# Patient Record
Sex: MALE | Race: WHITE | Employment: OTHER | URBAN - METROPOLITAN AREA
[De-identification: names, ages, dates, MRNs, and addresses within clinical notes are randomized per-mention and may not be internally consistent; named-entity substitution may affect disease eponyms.]

---

## 2017-04-01 NOTE — TELEPHONE ENCOUNTER
Patient calling to get an RX [facility] medications [facility]   I tried selecting pharmacy and searching it and was not able to but I found it on [address]  Erasmo Kendall phone# [phone]

## 2017-04-05 NOTE — TELEPHONE ENCOUNTER
Refill protocol failed because the patient did not meet the protocol criteria.  Please advise in regards to refill request

## 2017-04-05 NOTE — TELEPHONE ENCOUNTER
Cholesterol Medications  Protocol Criteria:  · Appointment scheduled in the past 12 months or in the next 3 months  · ALT & LDL on file in the past 12 months  · ALT result < 80  · LDL result <130   Recent Visits       Provider Department Primary Dx    7 mo

## 2017-04-06 RX ORDER — ATORVASTATIN CALCIUM 10 MG/1
10 TABLET, FILM COATED ORAL NIGHTLY
Qty: 90 TABLET | Refills: 0 | Status: SHIPPED | OUTPATIENT
Start: 2017-04-06 | End: 2017-07-13

## 2017-07-07 ENCOUNTER — TELEPHONE (OUTPATIENT)
Dept: INTERNAL MEDICINE CLINIC | Facility: CLINIC | Age: 62
End: 2017-07-07

## 2017-07-07 NOTE — TELEPHONE ENCOUNTER
Pt requesting a refill     Current Outpatient Prescriptions:  Atorvastatin Calcium 10 MG Oral Tab Take 1 tablet (10 mg total) by mouth nightly.  Disp: 90 tablet Rfl: 0   MetFORMIN HCl 500 MG Oral Tab Take 2 tablets (1,000 mg total) by mouth 2 (two) times da

## 2017-07-11 ENCOUNTER — TELEPHONE (OUTPATIENT)
Dept: INTERNAL MEDICINE CLINIC | Facility: CLINIC | Age: 62
End: 2017-07-11

## 2017-07-11 NOTE — TELEPHONE ENCOUNTER
Pt dropped forms to be completed by Dr Viktoria Juarez for a job and needs them back as soon as possible. Forms were put in Dr Nicholas Dimas box at [employer].

## 2017-07-12 NOTE — TELEPHONE ENCOUNTER
Both medications fail protocol - please advise  Metoprolol and atorvastatin  ________________________________________________    Hypertensive Medications  Protocol Criteria: CRITERIA FAILED  · Appointment scheduled in the past 6 months or in the next 3 mon Tomorrow Jami Wiley MD [facility], [facility] [place], [address] [facility] Monique Ramos saw Tawnya Lance in Aug. Needs paperwork filled out for job. Paperwork is in RX drawer.  Pt is a self pay          Lab Results  Component Value Date   LDL Test Not Performed 09/08/2016       L

## 2017-07-13 ENCOUNTER — OFFICE VISIT (OUTPATIENT)
Dept: INTERNAL MEDICINE CLINIC | Facility: CLINIC | Age: 62
End: 2017-07-13

## 2017-07-13 ENCOUNTER — APPOINTMENT (OUTPATIENT)
Dept: LAB | Age: 62
End: 2017-07-13
Attending: INTERNAL MEDICINE

## 2017-07-13 VITALS
TEMPERATURE: 99 F | SYSTOLIC BLOOD PRESSURE: 121 MMHG | HEIGHT: 70 IN | BODY MASS INDEX: 30.92 KG/M2 | WEIGHT: 216 LBS | HEART RATE: 73 BPM | DIASTOLIC BLOOD PRESSURE: 72 MMHG

## 2017-07-13 DIAGNOSIS — E11.9 TYPE 2 DIABETES MELLITUS WITHOUT COMPLICATION, WITHOUT LONG-TERM CURRENT USE OF INSULIN (HCC): Primary | ICD-10-CM

## 2017-07-13 DIAGNOSIS — E78.00 PURE HYPERCHOLESTEROLEMIA: ICD-10-CM

## 2017-07-13 DIAGNOSIS — E11.9 TYPE 2 DIABETES MELLITUS WITHOUT COMPLICATION, WITHOUT LONG-TERM CURRENT USE OF INSULIN (HCC): ICD-10-CM

## 2017-07-13 LAB
ALBUMIN SERPL BCP-MCNC: 4.1 G/DL (ref 3.5–4.8)
ALBUMIN/GLOB SERPL: 1.3 {RATIO} (ref 1–2)
ALP SERPL-CCNC: 36 U/L (ref 32–100)
ALT SERPL-CCNC: 33 U/L (ref 17–63)
ANION GAP SERPL CALC-SCNC: 11 MMOL/L (ref 0–18)
AST SERPL-CCNC: 23 U/L (ref 15–41)
BILIRUB SERPL-MCNC: 0.8 MG/DL (ref 0.3–1.2)
BILIRUB UR QL: NEGATIVE
BUN SERPL-MCNC: 10 MG/DL (ref 8–20)
BUN/CREAT SERPL: 13.3 (ref 10–20)
CALCIUM SERPL-MCNC: 9.5 MG/DL (ref 8.5–10.5)
CHLORIDE SERPL-SCNC: 103 MMOL/L (ref 95–110)
CLARITY UR: CLEAR
CO2 SERPL-SCNC: 24 MMOL/L (ref 22–32)
COLOR UR: YELLOW
CREAT SERPL-MCNC: 0.75 MG/DL (ref 0.5–1.5)
CREAT UR-MCNC: 93.3 MG/DL
GLOBULIN PLAS-MCNC: 3.2 G/DL (ref 2.5–3.7)
GLUCOSE SERPL-MCNC: 116 MG/DL (ref 70–99)
GLUCOSE UR-MCNC: >=500 MG/DL
HBA1C MFR BLD: 5.9 % (ref 4–6)
HGB UR QL STRIP.AUTO: NEGATIVE
KETONES UR-MCNC: NEGATIVE MG/DL
LEUKOCYTE ESTERASE UR QL STRIP.AUTO: NEGATIVE
MICROALBUMIN UR-MCNC: 0.6 MG/DL (ref 0–1.8)
MICROALBUMIN/CREAT UR: 6.4 MG/G{CREAT} (ref 0–20)
NITRITE UR QL STRIP.AUTO: NEGATIVE
OSMOLALITY UR CALC.SUM OF ELEC: 286 MOSM/KG (ref 275–295)
PH UR: 6 [PH] (ref 5–8)
POTASSIUM SERPL-SCNC: 4.1 MMOL/L (ref 3.3–5.1)
PROT SERPL-MCNC: 7.3 G/DL (ref 5.9–8.4)
PROT UR-MCNC: NEGATIVE MG/DL
RBC #/AREA URNS AUTO: <1 /HPF
SODIUM SERPL-SCNC: 138 MMOL/L (ref 136–144)
SP GR UR STRIP: 1.02 (ref 1–1.03)
UROBILINOGEN UR STRIP-ACNC: <2
VIT C UR-MCNC: NEGATIVE MG/DL
WBC #/AREA URNS AUTO: <1 /HPF

## 2017-07-13 PROCEDURE — 82043 UR ALBUMIN QUANTITATIVE: CPT

## 2017-07-13 PROCEDURE — 83036 HEMOGLOBIN GLYCOSYLATED A1C: CPT

## 2017-07-13 PROCEDURE — 99214 OFFICE O/P EST MOD 30 MIN: CPT | Performed by: INTERNAL MEDICINE

## 2017-07-13 PROCEDURE — 36415 COLL VENOUS BLD VENIPUNCTURE: CPT

## 2017-07-13 PROCEDURE — 80053 COMPREHEN METABOLIC PANEL: CPT

## 2017-07-13 PROCEDURE — 99212 OFFICE O/P EST SF 10 MIN: CPT | Performed by: INTERNAL MEDICINE

## 2017-07-13 PROCEDURE — 81001 URINALYSIS AUTO W/SCOPE: CPT

## 2017-07-13 PROCEDURE — 82570 ASSAY OF URINE CREATININE: CPT

## 2017-07-13 RX ORDER — ATORVASTATIN CALCIUM 10 MG/1
10 TABLET, FILM COATED ORAL NIGHTLY
Qty: 90 TABLET | Refills: 0 | Status: SHIPPED | OUTPATIENT
Start: 2017-07-13 | End: 2017-07-18

## 2017-07-13 NOTE — PROGRESS NOTES
Patient ID: Negro Mom is a 60year old male. Patient presents with:  Complete Form  Medication Request: Atorvastatin and Metformin       HISTORY OF PRESENT ILLNESS:   HPI  Patient presents for above.   Here to have forms filled out so that he can wo Vitamins-Minerals (MULTI-VITAMIN/MINERALS) Oral Tab, Take 1 tablet by mouth daily. , Disp: , Rfl:   •  Glucose Blood (FREESTYLE LITE TEST) In Vitro Strip, Test by Intradermal route 2 times every day, Disp: 100 each, Rfl: 11  •  [employer] LANCETS ULTRATHIN 30G diabetes mellitus without complication, without long-term current use of insulin (HCC)  · MetFORMIN HCl 500 MG Oral Tab; Take 2 tablets (1,000 mg total) by mouth 2 (two) times daily with meals. Dispense: 360 tablet;  Refill: 0  · COMP METABOLIC PANEL (14);

## 2017-07-13 NOTE — PATIENT INSTRUCTIONS
Diabetes and Heart Disease     Take your medicines as directed each day, even if you feel fine. If you have diabetes, you are two to four times more likely to have heart disease than someone without diabetes.  This higher risk is due to diabetes, but it Certain lifestyle factors can increase levels of your blood sugar, blood pressure, and lipids. Such increases raise your risk of heart disease:  · Smoking damages the lining of your arteries. This allows plaque to build up in the artery walls.  Smoking also · Eating right can reduce your risk factors and help you lose weight. Try to limit the amount of processed or refined carbohydrates you eat at one time. Cut back on your total calorie intake. Eat foods low in saturated fat and cholesterol.  Eat fiber, inclu

## 2017-07-18 ENCOUNTER — TELEPHONE (OUTPATIENT)
Dept: INTERNAL MEDICINE CLINIC | Facility: CLINIC | Age: 62
End: 2017-07-18

## 2017-07-18 DIAGNOSIS — E78.00 PURE HYPERCHOLESTEROLEMIA: ICD-10-CM

## 2017-07-18 RX ORDER — ATORVASTATIN CALCIUM 10 MG/1
10 TABLET, FILM COATED ORAL NIGHTLY
Qty: 90 TABLET | Refills: 0 | Status: SHIPPED | OUTPATIENT
Start: 2017-07-18 | End: 2017-10-17

## 2017-07-18 NOTE — TELEPHONE ENCOUNTER
Pt was seen in the office on Friday and the med below was not sent over to the pharmacy. Please re send. Pt is out. atorvastatin 10 MG Oral Tab 90 tablet 0 7/13/2017    Sig :  Take 1 tablet (10 mg total) by mouth nightly.      Route:   Oral     Order #

## 2017-07-18 NOTE — TELEPHONE ENCOUNTER
Pt was seen in the office on Friday and the med below was not sent over to the pharmacy. Please re send. Pt is out. atorvastatin 10 MG Oral Tab 90 tablet 0 7/13/2017    Sig :  Take 1 tablet (10 mg total) by mouth nightly.      Route:   Oral     Order #:

## 2017-07-19 NOTE — TELEPHONE ENCOUNTER
Called pharmacy, they still do not have prescription for atorvastatin. Phoned in as ordered by Dr. Debra Cardona.

## 2017-10-16 ENCOUNTER — TELEPHONE (OUTPATIENT)
Dept: INTERNAL MEDICINE CLINIC | Facility: CLINIC | Age: 62
End: 2017-10-16

## 2017-10-16 DIAGNOSIS — E11.9 TYPE 2 DIABETES MELLITUS WITHOUT COMPLICATION, WITHOUT LONG-TERM CURRENT USE OF INSULIN (HCC): ICD-10-CM

## 2017-10-16 DIAGNOSIS — E78.00 PURE HYPERCHOLESTEROLEMIA: ICD-10-CM

## 2017-10-16 NOTE — TELEPHONE ENCOUNTER
Pt would like a refill on atorvastatin and metformin medication. Pharmacy: [facility]      Current Outpatient Prescriptions:  atorvastatin 10 MG Oral Tab Take 1 tablet (10 mg total) by mouth nightly.  Disp: 90 tablet Rfl: 0   MetFORMIN HCl 500 M

## 2017-10-17 RX ORDER — ATORVASTATIN CALCIUM 10 MG/1
10 TABLET, FILM COATED ORAL NIGHTLY
Qty: 90 TABLET | Refills: 0 | Status: SHIPPED | OUTPATIENT
Start: 2017-10-17 | End: 2018-01-19

## 2017-10-17 NOTE — TELEPHONE ENCOUNTER
rxs refilled as per protocol.   Diabetes Medications  Protocol Criteria:  · Appointment scheduled in the past 6 months or the next 3 months  · A1C < 7.5 in the past 6 months  · Creatinine in the past 12 months  · Creatinine result < 1.5   Recent Outpatien right flank pain    Melanie Jeffrey MD    Office Visit    2 years ago Type 2 diabetes mellitus without complication [facility])    Melanie Jeffrey MD    Office Visit

## 2018-01-19 DIAGNOSIS — E78.00 PURE HYPERCHOLESTEROLEMIA: ICD-10-CM

## 2018-01-19 NOTE — TELEPHONE ENCOUNTER
Pt calling to request refill for atorvastatin 10 MG Oral Tab. Please advise pt has two left      Current Outpatient Prescriptions:  atorvastatin 10 MG Oral Tab Take 1 tablet (10 mg total) by mouth nightly.  Disp: 90 tablet Rfl: 0

## 2018-01-19 NOTE — TELEPHONE ENCOUNTER
Pending Prescriptions Disp Refills    atorvastatin 10 MG Oral Tab 90 tablet 0     Sig: Take 1 tablet (10 mg total) by mouth nightly.            Last Office Visit with PCP: Visit date not found  Last Blood Pressures:  BP Readings from Last 2 Encounters:  0

## 2018-01-22 DIAGNOSIS — E11.9 TYPE 2 DIABETES MELLITUS WITHOUT COMPLICATION, WITHOUT LONG-TERM CURRENT USE OF INSULIN (HCC): ICD-10-CM

## 2018-01-22 RX ORDER — ATORVASTATIN CALCIUM 10 MG/1
10 TABLET, FILM COATED ORAL NIGHTLY
Qty: 90 TABLET | Refills: 0 | Status: SHIPPED | OUTPATIENT
Start: 2018-01-22 | End: 2018-04-18

## 2018-04-18 DIAGNOSIS — E78.00 PURE HYPERCHOLESTEROLEMIA: ICD-10-CM

## 2018-04-19 RX ORDER — ATORVASTATIN CALCIUM 10 MG/1
TABLET, FILM COATED ORAL
Qty: 90 TABLET | Refills: 0 | Status: SHIPPED | OUTPATIENT
Start: 2018-04-19 | End: 2019-04-25

## 2018-04-21 DIAGNOSIS — E78.00 PURE HYPERCHOLESTEROLEMIA: ICD-10-CM

## 2018-04-21 DIAGNOSIS — E11.9 TYPE 2 DIABETES MELLITUS WITHOUT COMPLICATION, WITHOUT LONG-TERM CURRENT USE OF INSULIN (HCC): ICD-10-CM

## 2018-04-23 RX ORDER — ATORVASTATIN CALCIUM 10 MG/1
TABLET, FILM COATED ORAL
Qty: 90 TABLET | Refills: 0 | Status: SHIPPED | OUTPATIENT
Start: 2018-04-23 | End: 2018-07-25

## 2018-07-25 DIAGNOSIS — E78.00 PURE HYPERCHOLESTEROLEMIA: ICD-10-CM

## 2018-07-25 DIAGNOSIS — E11.9 TYPE 2 DIABETES MELLITUS WITHOUT COMPLICATION, WITHOUT LONG-TERM CURRENT USE OF INSULIN (HCC): ICD-10-CM

## 2018-07-25 RX ORDER — ATORVASTATIN CALCIUM 10 MG/1
TABLET, FILM COATED ORAL
Qty: 90 TABLET | Refills: 0 | Status: SHIPPED | OUTPATIENT
Start: 2018-07-25 | End: 2018-09-10

## 2018-09-10 ENCOUNTER — OFFICE VISIT (OUTPATIENT)
Dept: INTERNAL MEDICINE CLINIC | Facility: CLINIC | Age: 63
End: 2018-09-10
Payer: COMMERCIAL

## 2018-09-10 VITALS
HEIGHT: 70 IN | BODY MASS INDEX: 31.5 KG/M2 | WEIGHT: 220 LBS | SYSTOLIC BLOOD PRESSURE: 134 MMHG | HEART RATE: 85 BPM | DIASTOLIC BLOOD PRESSURE: 75 MMHG

## 2018-09-10 DIAGNOSIS — E11.9 TYPE 2 DIABETES MELLITUS WITHOUT COMPLICATION, WITHOUT LONG-TERM CURRENT USE OF INSULIN (HCC): ICD-10-CM

## 2018-09-10 DIAGNOSIS — Z00.00 PHYSICAL EXAM, ANNUAL: Primary | ICD-10-CM

## 2018-09-10 PROCEDURE — 99214 OFFICE O/P EST MOD 30 MIN: CPT | Performed by: INTERNAL MEDICINE

## 2018-09-10 PROCEDURE — 99212 OFFICE O/P EST SF 10 MIN: CPT | Performed by: INTERNAL MEDICINE

## 2018-09-10 NOTE — PROGRESS NOTES
Camelia Wlil is a 58year old male who presents for a complete physical exam.   HPI:   Pt complains of diabetes    There is no immunization history on file for this patient.   Wt Readings from Last 6 Encounters:  09/10/18 : 220 lb (99.8 kg)  07/13/17 : 2 daily. Disp:  Rfl:    Glucosamine-Chondroitin 750-600 MG Oral Tab Take 1 tablet by mouth daily.  Disp:  Rfl:       Past Medical History:   Diagnosis Date   • Diabetes ([facility] [address]    • High cholesterol    • Hyperlipidemia       Past Surgical History:  No date: LAST clear  EYES:PERRLA, EOMI, normal optic disk,conjunctiva are clear  NECK: supple,no adenopathy,no bruits  CHEST: no chest tenderness  BREAST: no dominant or suspicious mass  LUNGS: clear to auscultation  CARDIO: RRR without murmur  GI: good BS's,no masses,

## 2018-10-11 DIAGNOSIS — E11.9 TYPE 2 DIABETES MELLITUS WITHOUT COMPLICATION, WITHOUT LONG-TERM CURRENT USE OF INSULIN (HCC): ICD-10-CM

## 2019-01-13 DIAGNOSIS — E11.9 TYPE 2 DIABETES MELLITUS WITHOUT COMPLICATION, WITHOUT LONG-TERM CURRENT USE OF INSULIN (HCC): ICD-10-CM

## 2019-01-13 DIAGNOSIS — E78.00 PURE HYPERCHOLESTEROLEMIA: ICD-10-CM

## 2019-01-13 NOTE — TELEPHONE ENCOUNTER
Please review; protocol failed.     Cholesterol Medications  Protocol Criteria:  · Appointment scheduled in the past 12 months or in the next 3 months  · ALT & LDL on file in the past 12 months  · ALT result < 80  · LDL result <130   Recent Outpatient Visit Aniya Richardson MD    Office Visit    2 years ago Chronic right flank pain    Aniya Richardson MD    Office Visit          Lab Results   Component Value Date    A1C 5.9 07

## 2019-01-14 RX ORDER — ATORVASTATIN CALCIUM 10 MG/1
TABLET, FILM COATED ORAL
Qty: 90 TABLET | Refills: 0 | Status: SHIPPED | OUTPATIENT
Start: 2019-01-14 | End: 2019-04-23

## 2019-01-19 DIAGNOSIS — E78.00 PURE HYPERCHOLESTEROLEMIA: ICD-10-CM

## 2019-01-19 DIAGNOSIS — E11.9 TYPE 2 DIABETES MELLITUS WITHOUT COMPLICATION, WITHOUT LONG-TERM CURRENT USE OF INSULIN (HCC): ICD-10-CM

## 2019-01-19 RX ORDER — ATORVASTATIN CALCIUM 10 MG/1
TABLET, FILM COATED ORAL
Qty: 90 TABLET | Refills: 0 | OUTPATIENT
Start: 2019-01-19

## 2019-01-20 NOTE — TELEPHONE ENCOUNTER
Requested Prescriptions     Pending Prescriptions Disp Refills   • METFORMIN  MG Oral Tab [Pharmacy Med Name: MetFORMIN HCl Oral Tablet 500 MG] 360 tablet 0     Sig: TAKE 2 TABLETS BY MOUTH TWO TIMES A DAY WITH MEALS   • ATORVASTATIN 10 MG Oral Ta

## 2019-04-03 NOTE — TELEPHONE ENCOUNTER
Medications refilled at appointment visit. Appropriate labs ordered to fill out forms. Further recommendations are going to be based on these lab tests. Parainfluenza ; cough /bronchospam ; Ear infection        If fever or symptoms persist follow-up with PCP or return to UC or ER     RX:  azithro    tamiflu   pred  inhlaer      OTC  Nasal saline 2-3 puffs 3-4 times per day Ã5 days  Mucinex/guaifenesin 400 mg 3 times a day Ã7 days    Increase non-water fluid  V8 splash   Pedialyte 6-8 ounces every hour Ã1-2 days  Clear soup 3-4 times per day x 3 day ( caution if on  Salt restricted diet)  Coconut water    Probiotics:  Choose one, take 2 time per day for length of Rx  Rosanna santos  Acidophilus      Patient Education     2009 H1N1 Influenza (Swine Flu) (Adult)  The 2009 H1N1 influenza (swine flu) is a respiratory disease caused by the influenza A (H1N1) strain of the influenza viruses. Scientists originally thought the virus came from pigs (swine). But it is now known that this is not the case. This germ is a virus that was discovered in 2009. The 2009 H1N1 flu virus can be passed among people the same way the regular flu spreads--through droplets that form when someone with the virus coughs, sneezes, laughs, or talks. These droplets pass from person to person through the air. You can also become infected if you touch a surface on which the droplets have landed and then transfer the virus to your eyes, nose, or mouth. 2009 H1N1 flu symptoms are about the same as regular flu symptoms. These include fever and chills, headache, body and muscle aches, dry cough, runny nose and weakness. You may also have sore throat, diarrhea, or vomiting. There is no way to know for sure in the emergency department whether you have 2009 H1N1 or another type of influenza. If the 2009 H1N1 flu is in your area, you may be tested for it. You will be notified when the test results come back. In the meantime, follow the instructions you are given, including the measures below.   Home Care  Note: Seek Immediate Medical Care If YouâRe Having A Hard Time Breathing Or Are Breathing Very Fast, Or If YouâRe Starting To Get Confused. (See additional emergency warning signs below under Get Prompt Medical Attention.)  Medications:   Â· If the 2009 H1N1 flu is in your area and your symptoms are severe, the doctor may prescribe medications called antivirals. These must be taken within 2 days of when your symptoms started. Antivirals work by stopping the virus from reproducing in your body. This gives your bodyâs immune system a chance to fight the virus. After taking the medication, your symptoms may be milder and you may recover quicker than without the medication. The medication may also prevent serious complications such as pneumonia. Mild side effects from these drugs occasionally occur (the chance of side effects is 5% to 10%). Serious side effects are rare. The doctor will decide if this medication is needed. Follow your doctorâs instructions for taking these medications. Take ALL medication as prescribed until it is gone. Â· If your symptoms are mild or it has been more than 2 days since your symptoms started, the doctor will likely not prescribe medications. Â· Antibiotics are NOT helpful against influenza. Â· You may use acetaminophen (Tylenol) or ibuprofen (Motrin, Advil) to control fever and muscle aching. (NOTE: If you have chronic liver or kidney disease or ever had a stomach ulcer or GI bleeding, talk with your doctor before using these medicines.) Do not give aspirin to anyone under 25years of age who is ill with a fever. It may cause severe liver damage. Â· A vaccine against 2009 H1N1 flu is available. Talk to your doctor about whether the vaccine is right for you. General Care:   Â· Unless told otherwise by your doctor, drink plenty of non-alcoholic fluids, such as water or juice, to prevent dehydration. A good rule is to drink enough so that you urinate your normal amount. Â· Get plenty of rest.  Preventing The Spread:   Â· Wash your hands often, especially after coughing or sneezing.  Or, clean your hands with an alcohol-based hand gel containing at least 60 percent alcohol. Â· Cough or sneeze into a tissue. Then throw the tissue away and wash your hands. If you donât have a tissue, cough or sneeze into the crook of your elbow. Ask your healthcare provider whether you should wear a medical facemask over your mouth and nose to help prevent spreading the virus. Â· Stay home until your fever has been gone for at least 24 hours and you donât have fever symptoms (such as chills). Be sure the fever isnât being hidden by fever-reducing medications (such as acetaminophen or ibuprofen). Â· Donât share food, utensils, drinking glasses, or a toothbrush with others. Â· Have family members wash their hands often. They should avoid touching their eyes, nose, and mouth as much as possible. Â· Ask your doctor whether others in your household should receive antiviral medication to help them avoid infection. Follow Up  with your doctor or as directed by our staff if you are not improving over the next week. You will be told how to get test results. Note:  2009 H1N1 flu is not caused by eating pork or pork products. Eating pork or pork products that have been properly handled and cooked is safe. To learn more about 2009 H1N1 flu, visit the Centers for Disease Control and Prevention (CDC) website: www.cdc.gov/flu/swineflu/index.htm. Get Prompt Medical Attention  if any of the following occur:  Â· Trouble breathing or shortness of breath  Â· Dizziness or lightheadedness  Â· Chest pain or pressure  Â· Confusion, behavior change, or seizure  Â· Severe or repeated vomiting  Â© 6678-7708 [address]. All rights reserved. This information is not intended as a substitute for professional medical care. Always follow your healthcare professional's instructions.

## 2019-04-23 DIAGNOSIS — E78.00 PURE HYPERCHOLESTEROLEMIA: ICD-10-CM

## 2019-04-25 RX ORDER — ATORVASTATIN CALCIUM 10 MG/1
TABLET, FILM COATED ORAL
Qty: 90 TABLET | Refills: 2 | Status: SHIPPED | OUTPATIENT
Start: 2019-04-25 | End: 2019-12-23

## 2019-04-25 NOTE — TELEPHONE ENCOUNTER
Please review; protocol failed. Patient left a phone message and sent a MyChart message about lab work.     Cholesterol Medications  Protocol Criteria:  · Appointment scheduled in the past 12 months or in the next 3 months  · ALT & LDL on file in the past 1

## 2019-05-03 ENCOUNTER — LAB ENCOUNTER (OUTPATIENT)
Dept: LAB | Age: 64
End: 2019-05-03
Attending: INTERNAL MEDICINE
Payer: MEDICARE

## 2019-05-03 ENCOUNTER — OFFICE VISIT (OUTPATIENT)
Dept: INTERNAL MEDICINE CLINIC | Facility: CLINIC | Age: 64
End: 2019-05-03
Payer: MEDICARE

## 2019-05-03 VITALS
RESPIRATION RATE: 16 BRPM | HEIGHT: 70 IN | HEART RATE: 76 BPM | DIASTOLIC BLOOD PRESSURE: 77 MMHG | SYSTOLIC BLOOD PRESSURE: 128 MMHG | WEIGHT: 220 LBS | BODY MASS INDEX: 31.5 KG/M2

## 2019-05-03 DIAGNOSIS — Z13.6 SCREENING FOR CARDIOVASCULAR CONDITION: ICD-10-CM

## 2019-05-03 DIAGNOSIS — Z13.1 SCREENING FOR DIABETES MELLITUS (DM): ICD-10-CM

## 2019-05-03 DIAGNOSIS — Z12.5 SCREENING FOR PROSTATE CANCER: ICD-10-CM

## 2019-05-03 DIAGNOSIS — Z00.00 PHYSICAL EXAM, ANNUAL: Primary | ICD-10-CM

## 2019-05-03 DIAGNOSIS — Z00.00 ENCOUNTER FOR ANNUAL HEALTH EXAMINATION: ICD-10-CM

## 2019-05-03 DIAGNOSIS — E11.9 TYPE 2 DIABETES MELLITUS WITHOUT COMPLICATION, WITHOUT LONG-TERM CURRENT USE OF INSULIN (HCC): ICD-10-CM

## 2019-05-03 DIAGNOSIS — Z13.6 ENCOUNTER FOR ABDOMINAL AORTIC ANEURYSM SCREENING: ICD-10-CM

## 2019-05-03 DIAGNOSIS — Z11.59 NEED FOR HEPATITIS C SCREENING TEST: ICD-10-CM

## 2019-05-03 PROCEDURE — 82570 ASSAY OF URINE CREATININE: CPT

## 2019-05-03 PROCEDURE — 80061 LIPID PANEL: CPT

## 2019-05-03 PROCEDURE — 84443 ASSAY THYROID STIM HORMONE: CPT

## 2019-05-03 PROCEDURE — 36415 COLL VENOUS BLD VENIPUNCTURE: CPT

## 2019-05-03 PROCEDURE — 80053 COMPREHEN METABOLIC PANEL: CPT

## 2019-05-03 PROCEDURE — G0402 INITIAL PREVENTIVE EXAM: HCPCS | Performed by: INTERNAL MEDICINE

## 2019-05-03 PROCEDURE — 82043 UR ALBUMIN QUANTITATIVE: CPT

## 2019-05-03 PROCEDURE — 85025 COMPLETE CBC W/AUTO DIFF WBC: CPT

## 2019-05-03 PROCEDURE — 83036 HEMOGLOBIN GLYCOSYLATED A1C: CPT

## 2019-05-03 PROCEDURE — 86803 HEPATITIS C AB TEST: CPT

## 2019-05-03 PROCEDURE — 83721 ASSAY OF BLOOD LIPOPROTEIN: CPT

## 2019-05-03 PROCEDURE — G0102 PROSTATE CA SCREENING; DRE: HCPCS | Performed by: INTERNAL MEDICINE

## 2019-05-03 NOTE — PROGRESS NOTES
REASON FOR VISIT:    Brittany Will is a 58year old male who presents for a Medicare Initial Preventative Physical exam.    Patient Care Team: Patient Care Team:  Yanna Giles MD as PCP - General (Internal Medicine)    Patient Active Problem List Rng & Units 7/13/2017 9/8/2016 3/16/2016 8/14/2015 2/28/2014 9/26/2013 7/6/2012   BUN 8 - 20 mg/dL 10 15 17 15 15 11 12   Creatinine 0.50 - 1.50 mg/dL 0.75 0.84 1.00 1.03 1.12 0.85 0.81   Some recent data might be hidden     AST and ALT Latest Ref Rng & Un No  Problems with daily activities? : No  Memory Problems?: No      Fall/Risk Assessment     Have you fallen in the last 12 months?: 0-No  Fall/Risk Scorin        Depression Screening (PHQ-2/PHQ-9): Over the LAST 2 WEEKS   Little interest or pleasure i (H)     Glycohemoglobin (HgA1c) (%)   Date Value   07/13/2017 5.9       Creat/alb ratio  Annually CREATININE (mg/dL)   Date Value   11/22/2011 1.02     Creatinine (mg/dL)   Date Value   07/13/2017 0.75     CREATININE (P) (mg/dL)   Date Value   09/08/2016 0 stream  MUSCULOSKELETAL: denies back pain  NEURO: denies headaches  PSYCHE: denies depression or anxiety  HEMATOLOGIC: denies hx of anemia  ENDOCRINE: denies thyroid history  ALL/ASTHMA: denies hx of allergy or asthma    EXAM:   /77   Pulse 76   Resp year old  who presents for a recheck of  diabetes.  Diabetic control is unknown  Recommendations are: continue present meds, check HgbA1C, check fasting lipids and CMP, lose wgt with carbohydrate controlled diet and exercise, refer to Ophthalmology, check f

## 2019-05-03 NOTE — PATIENT INSTRUCTIONS
Dewayne Will's SCREENING SCHEDULE   Tests on this list are recommended by your physician but may not be covered, or covered at this frequency, by your insurer. Please check with your insurance carrier before scheduling to verify coverage.     AVA to patients who meet one of the following criteria:   • Men who are 73-68 years old and have smoked more than 100 cigarettes in their lifetime   • Anyone with a family history    Colorectal Cancer Screening Covered up to Age 76     Colonoscopy Screen   Cov as a HepB virus carrier   Homosexual men   Illicit injectable drug abusers     Tetanus Toxoid- Only covered with a cut with metal- TD and TDaP Not covered by Medicare Part B) No orders found for this or any previous visit.  This may be covered with your pre

## 2019-05-15 ENCOUNTER — HOSPITAL ENCOUNTER (OUTPATIENT)
Dept: ULTRASOUND IMAGING | Age: 64
Discharge: HOME | End: 2019-05-15
Attending: INTERNAL MEDICINE
Payer: MEDICARE

## 2019-05-15 DIAGNOSIS — Z13.6 ENCOUNTER FOR ABDOMINAL AORTIC ANEURYSM SCREENING: ICD-10-CM

## 2019-05-15 PROCEDURE — 76706 US ABDL AORTA SCREEN AAA: CPT | Performed by: INTERNAL MEDICINE

## 2019-07-01 DIAGNOSIS — E11.9 TYPE 2 DIABETES MELLITUS WITHOUT COMPLICATION, WITHOUT LONG-TERM CURRENT USE OF INSULIN (HCC): ICD-10-CM

## 2019-07-01 NOTE — TELEPHONE ENCOUNTER
Refill passed per [facility] protocol.   Diabetes Medications  Protocol Criteria:  · Appointment scheduled in the past 6 months or the next 3 months  · A1C < 7.5 in the past 6 months  · Creatinine in the past 12 months  · Creatinine result < 1.5   Rece

## 2019-07-05 ENCOUNTER — HOSPITAL ENCOUNTER (OUTPATIENT)
Dept: CT IMAGING | Age: 64
Discharge: HOME | End: 2019-07-05
Attending: INTERNAL MEDICINE

## 2019-07-05 DIAGNOSIS — Z13.6 SCREENING FOR CARDIOVASCULAR CONDITION: ICD-10-CM

## 2019-07-05 NOTE — PROGRESS NOTES
Pt seen at [facility]:  PRELIMINARY ASACI=0788    UJ=460/70    Declined blood work today; reviewed previous Cholesterol lab values with patient. All results and risk factors discussed with patient; all questions and concerns addressed.

## 2019-08-02 ENCOUNTER — HOSPITAL ENCOUNTER (OUTPATIENT)
Dept: ULTRASOUND IMAGING | Age: 64
Discharge: HOME | End: 2019-08-02
Attending: INTERNAL MEDICINE

## 2019-08-02 DIAGNOSIS — Z13.9 ENCOUNTER FOR SCREENING: ICD-10-CM

## 2019-08-02 NOTE — PROGRESS NOTES
Pt seen at [facility] Stroke Screening tests:  PRELIMINARY results as follows:    Carotid US=Right ICA; normal                       Left ICA; normal    Abdominal Aorta US=n/a      TG=565/76    Cholestec labs as follows:  OQ=898  HDL=24  L

## 2019-08-19 ENCOUNTER — OFFICE VISIT (OUTPATIENT)
Dept: CARDIOLOGY CLINIC | Facility: CLINIC | Age: 64
End: 2019-08-19
Payer: MEDICARE

## 2019-08-19 VITALS
DIASTOLIC BLOOD PRESSURE: 77 MMHG | HEART RATE: 97 BPM | BODY MASS INDEX: 32 KG/M2 | WEIGHT: 221 LBS | RESPIRATION RATE: 22 BRPM | OXYGEN SATURATION: 98 % | SYSTOLIC BLOOD PRESSURE: 139 MMHG

## 2019-08-19 DIAGNOSIS — E78.00 PURE HYPERCHOLESTEROLEMIA: ICD-10-CM

## 2019-08-19 DIAGNOSIS — R06.00 DYSPNEA ON EXERTION: ICD-10-CM

## 2019-08-19 DIAGNOSIS — E11.9 TYPE 2 DIABETES MELLITUS WITHOUT COMPLICATION, WITHOUT LONG-TERM CURRENT USE OF INSULIN (HCC): ICD-10-CM

## 2019-08-19 DIAGNOSIS — R93.1 ABNORMAL HEART SCORE CT: Primary | ICD-10-CM

## 2019-08-19 PROCEDURE — 93005 ELECTROCARDIOGRAM TRACING: CPT | Performed by: INTERNAL MEDICINE

## 2019-08-19 PROCEDURE — 99204 OFFICE O/P NEW MOD 45 MIN: CPT | Performed by: INTERNAL MEDICINE

## 2019-08-19 PROCEDURE — 93010 ELECTROCARDIOGRAM REPORT: CPT | Performed by: INTERNAL MEDICINE

## 2019-08-19 PROCEDURE — G0463 HOSPITAL OUTPT CLINIC VISIT: HCPCS | Performed by: INTERNAL MEDICINE

## 2019-08-19 NOTE — PROGRESS NOTES
Name:  Syd Parmar  :     Date of consultation:   2019    Referring physician: Ji Gunn MD    Reason for Visit:  Patient presents with:  Consult: Referred for abnormal CT Calcium Score      HPI:   This is a very pleasant 64 • Lung Disorder Mother         smoker 2ppd x 40 yrs   • Diabetes Sister         borderline   • Cancer Sister         non hodgkins lymphoma   • Psychiatric Sister         Bippolar   • Other (recovering ETOH) Brother    • Other (ETOH homeless) Brother >400 mg/dL    NONHDLC 115 05/03/2019    CALCNONHDL 131 (H) 09/08/2016     Lab Results   Component Value Date     (H) 05/03/2019    BUN 19 (H) 05/03/2019    BUNCREA 19.6 05/03/2019    CREATSERUM 0.97 05/03/2019    ANIONGAP 10 05/03/2019    GFRNAA 82

## 2019-08-22 ENCOUNTER — HOSPITAL ENCOUNTER (OUTPATIENT)
Dept: NUCLEAR MEDICINE | Facility: HOSPITAL | Age: 64
Discharge: HOME | End: 2019-08-22
Attending: INTERNAL MEDICINE
Payer: MEDICARE

## 2019-08-22 ENCOUNTER — HOSPITAL ENCOUNTER (OUTPATIENT)
Dept: CV DIAGNOSTICS | Facility: HOSPITAL | Age: 64
Discharge: HOME | End: 2019-08-22
Attending: INTERNAL MEDICINE
Payer: MEDICARE

## 2019-08-22 DIAGNOSIS — R06.00 DYSPNEA ON EXERTION: ICD-10-CM

## 2019-08-22 DIAGNOSIS — E11.9 TYPE 2 DIABETES MELLITUS WITHOUT COMPLICATION, WITHOUT LONG-TERM CURRENT USE OF INSULIN (HCC): ICD-10-CM

## 2019-08-22 DIAGNOSIS — E78.00 PURE HYPERCHOLESTEROLEMIA: ICD-10-CM

## 2019-08-22 DIAGNOSIS — R93.1 ABNORMAL HEART SCORE CT: ICD-10-CM

## 2019-08-22 PROCEDURE — 78452 HT MUSCLE IMAGE SPECT MULT: CPT | Performed by: INTERNAL MEDICINE

## 2019-08-22 PROCEDURE — 93018 CV STRESS TEST I&R ONLY: CPT | Performed by: INTERNAL MEDICINE

## 2019-08-22 PROCEDURE — 93017 CV STRESS TEST TRACING ONLY: CPT | Performed by: INTERNAL MEDICINE

## 2019-08-22 PROCEDURE — 93016 CV STRESS TEST SUPVJ ONLY: CPT | Performed by: INTERNAL MEDICINE

## 2019-08-22 RX ORDER — SODIUM CHLORIDE 9 MG/ML
INJECTION, SOLUTION INTRAVENOUS
Status: COMPLETED
Start: 2019-08-22 | End: 2019-08-22

## 2019-08-22 RX ADMIN — SODIUM CHLORIDE 50 ML: 9 INJECTION, SOLUTION INTRAVENOUS at 10:30:00

## 2019-12-20 DIAGNOSIS — E78.00 PURE HYPERCHOLESTEROLEMIA: ICD-10-CM

## 2019-12-20 DIAGNOSIS — E11.9 TYPE 2 DIABETES MELLITUS WITHOUT COMPLICATION, WITHOUT LONG-TERM CURRENT USE OF INSULIN (HCC): ICD-10-CM

## 2019-12-23 RX ORDER — ATORVASTATIN CALCIUM 10 MG/1
TABLET, FILM COATED ORAL
Qty: 90 TABLET | Refills: 1 | Status: SHIPPED | OUTPATIENT
Start: 2019-12-23 | End: 2020-06-15

## 2020-02-04 ENCOUNTER — APPOINTMENT (OUTPATIENT)
Dept: LAB | Age: 65
End: 2020-02-04
Attending: INTERNAL MEDICINE
Payer: MEDICARE

## 2020-02-04 ENCOUNTER — OFFICE VISIT (OUTPATIENT)
Dept: INTERNAL MEDICINE CLINIC | Facility: CLINIC | Age: 65
End: 2020-02-04
Payer: MEDICARE

## 2020-02-04 VITALS
DIASTOLIC BLOOD PRESSURE: 79 MMHG | BODY MASS INDEX: 32.21 KG/M2 | RESPIRATION RATE: 16 BRPM | SYSTOLIC BLOOD PRESSURE: 137 MMHG | HEIGHT: 70 IN | WEIGHT: 225 LBS | HEART RATE: 91 BPM

## 2020-02-04 DIAGNOSIS — E78.00 PURE HYPERCHOLESTEROLEMIA: ICD-10-CM

## 2020-02-04 DIAGNOSIS — Z23 NEED FOR VACCINATION: ICD-10-CM

## 2020-02-04 DIAGNOSIS — E11.9 TYPE 2 DIABETES MELLITUS WITHOUT COMPLICATION, WITHOUT LONG-TERM CURRENT USE OF INSULIN (HCC): ICD-10-CM

## 2020-02-04 DIAGNOSIS — G44.89 OTHER HEADACHE SYNDROME: Primary | ICD-10-CM

## 2020-02-04 LAB
CREAT UR-SCNC: 51.3 MG/DL
EST. AVERAGE GLUCOSE BLD GHB EST-MCNC: 163 MG/DL (ref 68–126)
HBA1C MFR BLD HPLC: 7.3 % (ref ?–5.7)
MICROALBUMIN UR-MCNC: 1.5 MG/DL
MICROALBUMIN/CREAT 24H UR-RTO: 29.2 UG/MG (ref ?–30)

## 2020-02-04 PROCEDURE — 82043 UR ALBUMIN QUANTITATIVE: CPT

## 2020-02-04 PROCEDURE — 83036 HEMOGLOBIN GLYCOSYLATED A1C: CPT

## 2020-02-04 PROCEDURE — 82570 ASSAY OF URINE CREATININE: CPT

## 2020-02-04 PROCEDURE — 36415 COLL VENOUS BLD VENIPUNCTURE: CPT

## 2020-02-04 PROCEDURE — G0008 ADMIN INFLUENZA VIRUS VAC: HCPCS | Performed by: INTERNAL MEDICINE

## 2020-02-04 PROCEDURE — 99214 OFFICE O/P EST MOD 30 MIN: CPT | Performed by: INTERNAL MEDICINE

## 2020-02-04 PROCEDURE — G0463 HOSPITAL OUTPT CLINIC VISIT: HCPCS | Performed by: INTERNAL MEDICINE

## 2020-02-04 PROCEDURE — 90662 IIV NO PRSV INCREASED AG IM: CPT | Performed by: INTERNAL MEDICINE

## 2020-02-04 NOTE — PROGRESS NOTES
Ronnie Will is a 71year old male. HPI:   1.  Type 2 diabetes mellitus without complication, without long-term current use of insulin (HCC)    The patient has been taking all prescribed diabetic medications at home and has been following a diabetic die Oral Tab Take 1 tablet by mouth daily.         Past Medical History:   Diagnosis Date   • Diabetes ([address]    • High cholesterol    • Hyperlipidemia       Social History:  Social History    Tobacco Use      Smoking status: Never Smoker      Smokeless tobacco: anti-hypertensive medicines exactly as prescribed and to follow a low salt diet as discussed. Regular exercise at least 3 times weekly will help to curb one's appetite, control weight and lead to better blood pressure control.  Record blood pressures at valerie

## 2020-05-12 ENCOUNTER — APPOINTMENT (OUTPATIENT)
Dept: LAB | Age: 65
End: 2020-05-12
Attending: INTERNAL MEDICINE
Payer: MEDICARE

## 2020-05-12 ENCOUNTER — OFFICE VISIT (OUTPATIENT)
Dept: INTERNAL MEDICINE CLINIC | Facility: CLINIC | Age: 65
End: 2020-05-12
Payer: MEDICARE

## 2020-05-12 VITALS
WEIGHT: 225 LBS | HEIGHT: 70 IN | RESPIRATION RATE: 17 BRPM | DIASTOLIC BLOOD PRESSURE: 82 MMHG | SYSTOLIC BLOOD PRESSURE: 138 MMHG | HEART RATE: 80 BPM | BODY MASS INDEX: 32.21 KG/M2

## 2020-05-12 DIAGNOSIS — I10 ESSENTIAL HYPERTENSION WITH GOAL BLOOD PRESSURE LESS THAN 130/85: ICD-10-CM

## 2020-05-12 DIAGNOSIS — E11.9 TYPE 2 DIABETES MELLITUS WITHOUT COMPLICATION, WITHOUT LONG-TERM CURRENT USE OF INSULIN (HCC): ICD-10-CM

## 2020-05-12 DIAGNOSIS — E78.2 MIXED HYPERLIPIDEMIA: ICD-10-CM

## 2020-05-12 DIAGNOSIS — E11.9 TYPE 2 DIABETES MELLITUS WITHOUT COMPLICATION, WITHOUT LONG-TERM CURRENT USE OF INSULIN (HCC): Primary | ICD-10-CM

## 2020-05-12 PROCEDURE — G0009 ADMIN PNEUMOCOCCAL VACCINE: HCPCS | Performed by: INTERNAL MEDICINE

## 2020-05-12 PROCEDURE — 99214 OFFICE O/P EST MOD 30 MIN: CPT | Performed by: INTERNAL MEDICINE

## 2020-05-12 PROCEDURE — G0463 HOSPITAL OUTPT CLINIC VISIT: HCPCS | Performed by: INTERNAL MEDICINE

## 2020-05-12 PROCEDURE — 83036 HEMOGLOBIN GLYCOSYLATED A1C: CPT

## 2020-05-12 PROCEDURE — 36415 COLL VENOUS BLD VENIPUNCTURE: CPT

## 2020-05-12 PROCEDURE — 90670 PCV13 VACCINE IM: CPT | Performed by: INTERNAL MEDICINE

## 2020-05-12 NOTE — PROGRESS NOTES
Kade Will is a 71year old male. HPI:   1.  Type 2 diabetes mellitus without complication, without long-term current use of insulin (HCC)    The patient has been taking all prescribed diabetic medications at home and has been following a diabetic die MG Oral Tab Take 1 tablet by mouth daily.         Past Medical History:   Diagnosis Date   • Diabetes ([address]    • High cholesterol    • Hyperlipidemia       Social History:  Social History    Tobacco Use      Smoking status: Never Smoker      Smokeless tobac Essential hypertension with goal blood pressure less than 130/85    Patient instructed to take anti-hypertensive medicines exactly as prescribed and to follow a low salt diet as discussed.  Regular exercise at least 3 times weekly will help to curb one's ap

## 2020-06-15 DIAGNOSIS — E11.9 TYPE 2 DIABETES MELLITUS WITHOUT COMPLICATION, WITHOUT LONG-TERM CURRENT USE OF INSULIN (HCC): ICD-10-CM

## 2020-06-15 DIAGNOSIS — E78.00 PURE HYPERCHOLESTEROLEMIA: ICD-10-CM

## 2020-06-15 RX ORDER — ATORVASTATIN CALCIUM 10 MG/1
TABLET, FILM COATED ORAL
Qty: 90 TABLET | Refills: 0 | Status: SHIPPED | OUTPATIENT
Start: 2020-06-15 | End: 2020-10-07

## 2020-10-07 DIAGNOSIS — E78.00 PURE HYPERCHOLESTEROLEMIA: ICD-10-CM

## 2020-10-07 DIAGNOSIS — E11.9 TYPE 2 DIABETES MELLITUS WITHOUT COMPLICATION, WITHOUT LONG-TERM CURRENT USE OF INSULIN (HCC): ICD-10-CM

## 2020-10-07 RX ORDER — ATORVASTATIN CALCIUM 10 MG/1
TABLET, FILM COATED ORAL
Qty: 90 TABLET | Refills: 0 | Status: SHIPPED | OUTPATIENT
Start: 2020-10-07 | End: 2020-10-08

## 2020-10-08 RX ORDER — ATORVASTATIN CALCIUM 10 MG/1
TABLET, FILM COATED ORAL
Qty: 90 TABLET | Refills: 0 | Status: SHIPPED | OUTPATIENT
Start: 2020-10-08 | End: 2021-04-12

## 2020-10-20 ENCOUNTER — OFFICE VISIT (OUTPATIENT)
Dept: INTERNAL MEDICINE CLINIC | Facility: CLINIC | Age: 65
End: 2020-10-20
Payer: MEDICARE

## 2020-10-20 ENCOUNTER — LAB ENCOUNTER (OUTPATIENT)
Dept: LAB | Age: 65
End: 2020-10-20
Attending: INTERNAL MEDICINE
Payer: MEDICARE

## 2020-10-20 VITALS
HEIGHT: 70 IN | SYSTOLIC BLOOD PRESSURE: 160 MMHG | DIASTOLIC BLOOD PRESSURE: 80 MMHG | WEIGHT: 220 LBS | BODY MASS INDEX: 31.5 KG/M2 | HEART RATE: 65 BPM | RESPIRATION RATE: 16 BRPM

## 2020-10-20 DIAGNOSIS — Z12.5 SCREENING FOR PROSTATE CANCER: ICD-10-CM

## 2020-10-20 DIAGNOSIS — E78.2 MIXED HYPERLIPIDEMIA: ICD-10-CM

## 2020-10-20 DIAGNOSIS — E11.9 TYPE 2 DIABETES MELLITUS WITHOUT COMPLICATION, WITHOUT LONG-TERM CURRENT USE OF INSULIN (HCC): ICD-10-CM

## 2020-10-20 DIAGNOSIS — I10 ESSENTIAL HYPERTENSION WITH GOAL BLOOD PRESSURE LESS THAN 130/85: ICD-10-CM

## 2020-10-20 DIAGNOSIS — Z00.00 PHYSICAL EXAM, ANNUAL: Primary | ICD-10-CM

## 2020-10-20 PROCEDURE — 80053 COMPREHEN METABOLIC PANEL: CPT

## 2020-10-20 PROCEDURE — 82043 UR ALBUMIN QUANTITATIVE: CPT

## 2020-10-20 PROCEDURE — 80061 LIPID PANEL: CPT

## 2020-10-20 PROCEDURE — 84443 ASSAY THYROID STIM HORMONE: CPT

## 2020-10-20 PROCEDURE — 84439 ASSAY OF FREE THYROXINE: CPT

## 2020-10-20 PROCEDURE — 81003 URINALYSIS AUTO W/O SCOPE: CPT

## 2020-10-20 PROCEDURE — 83036 HEMOGLOBIN GLYCOSYLATED A1C: CPT

## 2020-10-20 PROCEDURE — G0439 PPPS, SUBSEQ VISIT: HCPCS | Performed by: INTERNAL MEDICINE

## 2020-10-20 PROCEDURE — 85025 COMPLETE CBC W/AUTO DIFF WBC: CPT

## 2020-10-20 PROCEDURE — 36415 COLL VENOUS BLD VENIPUNCTURE: CPT

## 2020-10-20 PROCEDURE — 82570 ASSAY OF URINE CREATININE: CPT

## 2020-10-20 RX ORDER — GLIMEPIRIDE 4 MG/1
4 TABLET ORAL
Qty: 90 TABLET | Refills: 3 | Status: SHIPPED | OUTPATIENT
Start: 2020-10-20 | End: 2020-12-22

## 2020-10-20 NOTE — PROGRESS NOTES
REASON FOR VISIT:    Bridgette Will is a 76year old male who presents for a Medicare Initial Preventative Physical exam.    Patient Care Team: Patient Care Team:  Nalini Harrington MD as PCP - General (Internal Medicine)  Nalini Harrington MD as [facility] [phone] TNP TNP     BUN and Cr Latest Ref Rng & Units 5/3/2019 7/13/2017 9/8/2016 3/16/2016 8/14/2015 2/28/2014 9/26/2013   BUN 7 - 18 mg/dL 19(H) 10 15 17 15 15 11   Creatinine 0.70 - 1.30 mg/dL 0.97 0.75 0.84 1.00 1.03 1.12 0.85   Some recent data might No  Difficulty walking?: No  Difficulty dressing or bathing?: No  Problems with daily activities? : No  Memory Problems?: No      Fall/Risk Assessment     Have you fallen in the last 12 months?: 0-No  Fall/Risk Scorin        Depression Screening (PHQ-2 (%)   Date Value   05/12/2020 7.7 (H)       Creat/alb ratio  Annually CREATININE (mg/dL)   Date Value   11/22/2011 1.02     Creatinine (mg/dL)   Date Value   05/03/2019 0.97       LDL  Annually LDL Cholesterol (mg/dL)   Date Value   05/03/2019      Comment vision  HEENT: denies nasal congestion, sinus pain or ST  LUNGS: denies shortness of breath with exertion  CARDIOVASCULAR: denies chest pain on exertion  GI: denies abdominal pain, denies heartburn  : denies nocturia or changes in stream  MUSCULOSKELETAL fasting Lipids, CMP, CBC and PSA. Pt referred for screening colonoscopy  Needs repeat in 2025. Chrissy Rodriguez Pt discussed: exercise, low fat diet, testicular self exam and prostate cancer screening.      2. Type 2 diabetes mellitus without complication, without long-ter return in 3 months for office visit  Diet counseling perfomed  Exercise counseling perfomed    SUGGESTED VACCINATIONS - Influenza, Pneumococcal, Zoster, Tetanus   Influenza: Influenza Vaccine(1) due on 10/01/2020  Pneumonia: There are no preventive care re

## 2020-12-22 ENCOUNTER — OFFICE VISIT (OUTPATIENT)
Dept: ENDOCRINOLOGY CLINIC | Facility: CLINIC | Age: 65
End: 2020-12-22
Payer: MEDICARE

## 2020-12-22 VITALS
HEART RATE: 78 BPM | RESPIRATION RATE: 16 BRPM | DIASTOLIC BLOOD PRESSURE: 86 MMHG | SYSTOLIC BLOOD PRESSURE: 157 MMHG | HEIGHT: 70 IN | BODY MASS INDEX: 31.07 KG/M2 | WEIGHT: 217 LBS

## 2020-12-22 DIAGNOSIS — E11.65 TYPE 2 DIABETES MELLITUS WITH HYPERGLYCEMIA, WITHOUT LONG-TERM CURRENT USE OF INSULIN (HCC): Primary | ICD-10-CM

## 2020-12-22 PROCEDURE — 99203 OFFICE O/P NEW LOW 30 MIN: CPT | Performed by: INTERNAL MEDICINE

## 2020-12-22 PROCEDURE — 82947 ASSAY GLUCOSE BLOOD QUANT: CPT | Performed by: INTERNAL MEDICINE

## 2020-12-22 NOTE — H&P
New Patient Visit - Diabetes    CONSULT - Reason for Visit:  Diabetes management.     Requesting Physician: Dr. Nicholas Maurice:  Patient presents with:  Consult: Pcp referred for elevated a1c       HISTORY OF PRESENT ILLNESS:   Chela Will i History:   Diagnosis Date   • Diabetes ([facility] [address]    • High cholesterol    • Hyperlipidemia        PAST SURGICAL HISTORY:   Past Surgical History:   Procedure Laterality Date   • BACK SURGERY      epidural abscess of spinal cord   • COLONOSCOPY      Had one don Genito-Urinary: Negative for: dysuria, frequency  Psychiatric: Negative for:  depression, anxiety  Hematology/Lymphatics: Negative for: bruising, lower extremity edema  Endocrine: Negative for: polyuria, polydypsia  Skin: Negative for: rash, bliste explained  e). BG log maintainence explained in great detail, to get log and glucometer on next visit. f). Life style changes reviewed  g). Hypoglycemia recognition and management discussed    2.  Patient’s BP is high today, states it is always normal  He

## 2020-12-23 ENCOUNTER — TELEPHONE (OUTPATIENT)
Dept: ENDOCRINOLOGY CLINIC | Facility: CLINIC | Age: 65
End: 2020-12-23

## 2020-12-23 RX ORDER — GLIMEPIRIDE 2 MG/1
2 TABLET ORAL
Qty: 90 TABLET | Refills: 0 | Status: SHIPPED | OUTPATIENT
Start: 2020-12-23 | End: 2021-03-21

## 2020-12-23 NOTE — TELEPHONE ENCOUNTER
Pt states new rx was supposed to be sent to [facility] yesterday - they do not have it

## 2021-01-29 DIAGNOSIS — E11.9 TYPE 2 DIABETES MELLITUS WITHOUT COMPLICATION, WITHOUT LONG-TERM CURRENT USE OF INSULIN (HCC): ICD-10-CM

## 2021-02-06 DIAGNOSIS — Z23 NEED FOR VACCINATION: ICD-10-CM

## 2021-03-21 RX ORDER — GLIMEPIRIDE 2 MG/1
TABLET ORAL
Qty: 90 TABLET | Refills: 0 | Status: SHIPPED | OUTPATIENT
Start: 2021-03-21 | End: 2021-06-18

## 2021-04-02 ENCOUNTER — IMMUNIZATION (OUTPATIENT)
Dept: LAB | Age: 66
End: 2021-04-02
Attending: HOSPITALIST
Payer: MEDICARE

## 2021-04-02 DIAGNOSIS — Z23 NEED FOR VACCINATION: Primary | ICD-10-CM

## 2021-04-02 PROCEDURE — 0001A SARSCOV2 VAC 30MCG/0.3ML IM: CPT

## 2021-04-06 ENCOUNTER — OFFICE VISIT (OUTPATIENT)
Dept: ENDOCRINOLOGY CLINIC | Facility: CLINIC | Age: 66
End: 2021-04-06
Payer: MEDICARE

## 2021-04-06 VITALS
HEART RATE: 70 BPM | WEIGHT: 220 LBS | DIASTOLIC BLOOD PRESSURE: 71 MMHG | SYSTOLIC BLOOD PRESSURE: 132 MMHG | BODY MASS INDEX: 32 KG/M2

## 2021-04-06 DIAGNOSIS — E78.5 DYSLIPIDEMIA: ICD-10-CM

## 2021-04-06 DIAGNOSIS — E11.65 TYPE 2 DIABETES MELLITUS WITH HYPERGLYCEMIA, WITHOUT LONG-TERM CURRENT USE OF INSULIN (HCC): Primary | ICD-10-CM

## 2021-04-06 PROCEDURE — 36416 COLLJ CAPILLARY BLOOD SPEC: CPT | Performed by: INTERNAL MEDICINE

## 2021-04-06 PROCEDURE — 82947 ASSAY GLUCOSE BLOOD QUANT: CPT | Performed by: INTERNAL MEDICINE

## 2021-04-06 PROCEDURE — 83036 HEMOGLOBIN GLYCOSYLATED A1C: CPT | Performed by: INTERNAL MEDICINE

## 2021-04-06 PROCEDURE — 99213 OFFICE O/P EST LOW 20 MIN: CPT | Performed by: INTERNAL MEDICINE

## 2021-04-06 NOTE — PROGRESS NOTES
Return Office Visit     CHIEF COMPLAINT:  Patient presents with:  Diabetes: Pt is present to follow up with the doctor        HISTORY OF PRESENT ILLNESS:  Brandie Avalos is a 76year old male who presents for follow up for for DM.      DM HISTORY  Diagnos reviewed. See past surgical history marked as reviewed. See past family history marked as reviewed. See past social history marked as reviewed.     ASSESSMENTS:     REVIEW OF SYSTEMS:  Constitutional: Negative for: weight change, fever, fatigue, cold/hea Patient understands the importance of glycemic control and the implications of uncontrolled diabetes including Diabetic ketoacidosis and various micro vascular and macrovascular complications.           a).  Medications:  Metformin 1000 mg BID  Take with fo

## 2021-04-11 DIAGNOSIS — E78.00 PURE HYPERCHOLESTEROLEMIA: ICD-10-CM

## 2021-04-12 RX ORDER — ATORVASTATIN CALCIUM 10 MG/1
TABLET, FILM COATED ORAL
Qty: 90 TABLET | Refills: 0 | Status: SHIPPED | OUTPATIENT
Start: 2021-04-12 | End: 2021-07-15

## 2021-04-23 ENCOUNTER — IMMUNIZATION (OUTPATIENT)
Dept: LAB | Age: 66
End: 2021-04-23
Attending: HOSPITALIST
Payer: MEDICARE

## 2021-04-23 DIAGNOSIS — Z23 NEED FOR VACCINATION: Primary | ICD-10-CM

## 2021-04-23 PROCEDURE — 0002A SARSCOV2 VAC 30MCG/0.3ML IM: CPT

## 2021-05-03 DIAGNOSIS — E11.9 TYPE 2 DIABETES MELLITUS WITHOUT COMPLICATION, WITHOUT LONG-TERM CURRENT USE OF INSULIN (HCC): ICD-10-CM

## 2021-06-07 ENCOUNTER — PATIENT MESSAGE (OUTPATIENT)
Dept: INTERNAL MEDICINE CLINIC | Facility: CLINIC | Age: 66
End: 2021-06-07

## 2021-06-07 NOTE — TELEPHONE ENCOUNTER
From: Antonio Will  To: Austin Torrez MD  Sent: 6/7/2021 10:19 AM CDT  Subject: Non-Urgent [address] Dr.!!!    I would like to have Kathryn Olmstead be my choice for new physician.      Thanks and enjoy [facility] in good health

## 2021-06-18 RX ORDER — GLIMEPIRIDE 2 MG/1
TABLET ORAL
Qty: 90 TABLET | Refills: 0 | Status: SHIPPED | OUTPATIENT
Start: 2021-06-18 | End: 2021-06-20

## 2021-06-20 RX ORDER — GLIMEPIRIDE 2 MG/1
TABLET ORAL
Qty: 90 TABLET | Refills: 0 | Status: SHIPPED | OUTPATIENT
Start: 2021-06-20 | End: 2021-10-07

## 2021-07-15 ENCOUNTER — TELEPHONE (OUTPATIENT)
Dept: INTERNAL MEDICINE CLINIC | Facility: CLINIC | Age: 66
End: 2021-07-15

## 2021-07-15 DIAGNOSIS — E78.00 PURE HYPERCHOLESTEROLEMIA: ICD-10-CM

## 2021-07-15 RX ORDER — ATORVASTATIN CALCIUM 10 MG/1
10 TABLET, FILM COATED ORAL EVERY EVENING
Qty: 90 TABLET | Refills: 0 | Status: SHIPPED | OUTPATIENT
Start: 2021-07-15 | End: 2021-09-30

## 2021-07-15 NOTE — TELEPHONE ENCOUNTER
Attempted to call cell phone multiple times, no answer and phone call keeps disconnecting. Left detailed message on home phone, advised to call back with questions or concerns.

## 2021-07-15 NOTE — TELEPHONE ENCOUNTER
Patient states he is out of Atorvastatin 10 mg and requesting that to be sent to [facility] in [place].   Patient states he is currently on vacation in [facility], but when he comes home he will schedule a visit with Dr. Bere Freeman.

## 2021-07-29 ENCOUNTER — TELEPHONE (OUTPATIENT)
Dept: INTERNAL MEDICINE CLINIC | Facility: CLINIC | Age: 66
End: 2021-07-29

## 2021-07-29 DIAGNOSIS — E11.9 TYPE 2 DIABETES MELLITUS WITHOUT COMPLICATION, WITHOUT LONG-TERM CURRENT USE OF INSULIN (HCC): ICD-10-CM

## 2021-07-29 NOTE — TELEPHONE ENCOUNTER
Pt is requesting a refill on his metformin medication. Pt states that he is out of medication. Pharmacy: [facility]/[place], [place] (listed) pt states that he is going out of town tonight and would like this medication refilled.      Current Outpatien

## 2021-09-13 ENCOUNTER — OFFICE VISIT (OUTPATIENT)
Dept: ENDOCRINOLOGY CLINIC | Facility: CLINIC | Age: 66
End: 2021-09-13
Payer: MEDICARE

## 2021-09-13 VITALS
HEART RATE: 74 BPM | BODY MASS INDEX: 31.35 KG/M2 | WEIGHT: 219 LBS | HEIGHT: 70 IN | SYSTOLIC BLOOD PRESSURE: 137 MMHG | DIASTOLIC BLOOD PRESSURE: 69 MMHG

## 2021-09-13 DIAGNOSIS — E11.69 TYPE 2 DIABETES MELLITUS WITH OTHER SPECIFIED COMPLICATION, WITHOUT LONG-TERM CURRENT USE OF INSULIN (HCC): Primary | ICD-10-CM

## 2021-09-13 DIAGNOSIS — E78.5 DYSLIPIDEMIA: ICD-10-CM

## 2021-09-13 LAB
CARTRIDGE LOT#: ABNORMAL NUMERIC
GLUCOSE BLOOD: 154
HEMOGLOBIN A1C: 6.1 % (ref 4.3–5.6)
TEST STRIP LOT #: NORMAL NUMERIC

## 2021-09-13 PROCEDURE — 83036 HEMOGLOBIN GLYCOSYLATED A1C: CPT | Performed by: INTERNAL MEDICINE

## 2021-09-13 PROCEDURE — 99214 OFFICE O/P EST MOD 30 MIN: CPT | Performed by: INTERNAL MEDICINE

## 2021-09-13 PROCEDURE — 82947 ASSAY GLUCOSE BLOOD QUANT: CPT | Performed by: INTERNAL MEDICINE

## 2021-09-13 PROCEDURE — 36416 COLLJ CAPILLARY BLOOD SPEC: CPT | Performed by: INTERNAL MEDICINE

## 2021-09-13 NOTE — PROGRESS NOTES
Return Office Visit     CHIEF COMPLAINT:    DM     HISTORY OF PRESENT ILLNESS:  Britt Rosenberg is a 78year old male who presents for follow up for DM.      DM HISTORY  Diagnosed: around age  60     HISTORY OF DIABETES COMPLICATIONS: :  History of Retinop history marked as reviewed. See past surgical history marked as reviewed. See past family history marked as reviewed. See past social history marked as reviewed.     ASSESSMENTS:     REVIEW OF SYSTEMS:  Constitutional: Negative for: weight change, fever, pathogenesis, natural course of diabetes. Patient understands the importance of glycemic control and the implications of uncontrolled diabetes including Diabetic ketoacidosis and various micro vascular and macrovascular complications.           a).  Fide Berry

## 2021-09-24 ENCOUNTER — TELEPHONE (OUTPATIENT)
Dept: INTERNAL MEDICINE CLINIC | Facility: CLINIC | Age: 66
End: 2021-09-24

## 2021-09-30 DIAGNOSIS — E78.00 PURE HYPERCHOLESTEROLEMIA: ICD-10-CM

## 2021-09-30 RX ORDER — ATORVASTATIN CALCIUM 10 MG/1
10 TABLET, FILM COATED ORAL EVERY EVENING
Qty: 90 TABLET | Refills: 0 | Status: SHIPPED | OUTPATIENT
Start: 2021-09-30 | End: 2021-10-07

## 2021-09-30 NOTE — TELEPHONE ENCOUNTER
Refill passed per [facility] protocol.     Requested Prescriptions   Pending Prescriptions Disp Refills    ATORVASTATIN 10 MG Oral Tab [Pharmacy Med Name: Atorvastatin Calcium Oral Tablet 10 MG] 90 tablet 0     Sig: TAKE 1 TABLET BY MOUTH IN THE EVENING        Cholesterol Medication Protocol Passed - 9/30/2021 12:46 PM        Passed - ALT in past 12 months        Passed - LDL in past 12 months        Passed - Last ALT < 80       Lab Results   Component Value Date    ALT 54 10/20/2020             Passed - Last LDL < 130     Lab Results   Component Value Date    LDL 19 10/20/2020               Passed - Appointment in past 12 or next 3 months               Recent Outpatient Visits              2 weeks ago Type 2 diabetes mellitus with other specified complication, without long-term current use of insulin [facility])    [facility] Endocrinology Diana Rojas MD    Office Visit    5 months ago Type 2 diabetes mellitus with hyperglycemia, without long-term current use of insulin [facility])    [facility] Endocrinology Diana Rojas MD    Office Visit    9 months ago Type 2 diabetes mellitus with hyperglycemia, without long-term current use of insulin [facility])    [facility] Endocrinology Diana Rojas MD    Office Visit    11 months ago Physical exam, annual    [facility] [address] MD    Office Visit    1 year ago Type 2 diabetes mellitus without complication, without long-term current use of insulin [facility])    [facility] [address] Rollie Pon, MD    Office Visit            Future Appointments         Provider Department Appt Notes    In 1 week Marcello Alberto MD [facility] establish care, rx refill  informed of policy    In 4 months Diana Rojas, [address] [place] [address] Endocrinology 5 month fu

## 2021-09-30 NOTE — TELEPHONE ENCOUNTER
Additional refills will be provided at the time of appointment.      Future Appointments   Date Time Provider Aj Wang   10/7/2021  2:00 PM Colten Ocasio MD [facility]   2/14/2022  9:30 AM Jan Markham MD [address] [place] [address]

## 2021-10-07 ENCOUNTER — TELEPHONE (OUTPATIENT)
Dept: INTERNAL MEDICINE CLINIC | Facility: CLINIC | Age: 66
End: 2021-10-07

## 2021-10-07 ENCOUNTER — OFFICE VISIT (OUTPATIENT)
Dept: INTERNAL MEDICINE CLINIC | Facility: CLINIC | Age: 66
End: 2021-10-07
Payer: MEDICARE

## 2021-10-07 VITALS
RESPIRATION RATE: 14 BRPM | SYSTOLIC BLOOD PRESSURE: 120 MMHG | OXYGEN SATURATION: 97 % | DIASTOLIC BLOOD PRESSURE: 76 MMHG | WEIGHT: 217 LBS | HEART RATE: 82 BPM | HEIGHT: 70 IN | BODY MASS INDEX: 31.07 KG/M2

## 2021-10-07 DIAGNOSIS — N52.9 ERECTILE DYSFUNCTION, UNSPECIFIED ERECTILE DYSFUNCTION TYPE: ICD-10-CM

## 2021-10-07 DIAGNOSIS — E03.8 SUBCLINICAL HYPOTHYROIDISM: ICD-10-CM

## 2021-10-07 DIAGNOSIS — E11.9 TYPE 2 DIABETES MELLITUS WITHOUT COMPLICATION, WITHOUT LONG-TERM CURRENT USE OF INSULIN (HCC): ICD-10-CM

## 2021-10-07 DIAGNOSIS — E78.00 PURE HYPERCHOLESTEROLEMIA: Primary | ICD-10-CM

## 2021-10-07 DIAGNOSIS — Z12.5 SCREENING PSA (PROSTATE SPECIFIC ANTIGEN): ICD-10-CM

## 2021-10-07 PROBLEM — G06.2 EPIDURAL ABSCESS: Status: ACTIVE | Noted: 2021-10-07

## 2021-10-07 PROBLEM — G06.2 EPIDURAL ABSCESS (HCC): Status: ACTIVE | Noted: 2021-10-07

## 2021-10-07 PROCEDURE — 99214 OFFICE O/P EST MOD 30 MIN: CPT | Performed by: INTERNAL MEDICINE

## 2021-10-07 RX ORDER — ATORVASTATIN CALCIUM 10 MG/1
10 TABLET, FILM COATED ORAL EVERY EVENING
Qty: 90 TABLET | Refills: 1 | Status: SHIPPED | OUTPATIENT
Start: 2021-10-07

## 2021-10-07 RX ORDER — TADALAFIL 20 MG/1
20 TABLET ORAL AS NEEDED
Qty: 6 TABLET | Refills: 1 | Status: SHIPPED | OUTPATIENT
Start: 2021-10-07 | End: 2021-11-06

## 2021-10-07 RX ORDER — GLIMEPIRIDE 2 MG/1
2 TABLET ORAL
Qty: 90 TABLET | Refills: 1 | Status: SHIPPED | OUTPATIENT
Start: 2021-10-07

## 2021-10-07 NOTE — PROGRESS NOTES
Francis Will is a 78year old male. Patient presents with:  Establish Care: NP here to establish care     HPI:   69-year gentleman with a past medical history of diabetes, dyslipidemia, rectal dysfunction here for follow-up and establish care.   He was pr ELECTROCARDIOGRAM, COMPLETE      Scanned to media tab - DOS 09-      Social History:  Social History    Tobacco Use      Smoking status: Never Smoker      Smokeless tobacco: Never Used    Vaping Use      Vaping Use: Never used    Alcohol use:  Yes heart sounds. No murmur heard. Pulmonary:      Effort: Pulmonary effort is normal.      Breath sounds: Normal breath sounds. No rhonchi or rales. Abdominal:      General: Bowel sounds are normal.      Palpations: Abdomen is soft. Tenderness:  Bettie Corwin Medicare physical.      The patient indicates understanding of these issues and agrees to the plan. No follow-ups on file.

## 2021-10-07 NOTE — TELEPHONE ENCOUNTER
S[vinod with patient who stated he had his Dilated Eye Exam done last week on Friday with . Called  and spoke with Verónica Jones who will fax report to us today.

## 2021-10-12 ENCOUNTER — LAB ENCOUNTER (OUTPATIENT)
Dept: LAB | Age: 66
End: 2021-10-12
Attending: INTERNAL MEDICINE
Payer: MEDICARE

## 2021-10-12 ENCOUNTER — TELEPHONE (OUTPATIENT)
Dept: INTERNAL MEDICINE CLINIC | Facility: CLINIC | Age: 66
End: 2021-10-12

## 2021-10-12 DIAGNOSIS — E78.00 PURE HYPERCHOLESTEROLEMIA: ICD-10-CM

## 2021-10-12 DIAGNOSIS — E03.8 SUBCLINICAL HYPOTHYROIDISM: ICD-10-CM

## 2021-10-12 DIAGNOSIS — E11.9 TYPE 2 DIABETES MELLITUS WITHOUT COMPLICATION, WITHOUT LONG-TERM CURRENT USE OF INSULIN (HCC): ICD-10-CM

## 2021-10-12 PROCEDURE — 82043 UR ALBUMIN QUANTITATIVE: CPT

## 2021-10-12 PROCEDURE — 36415 COLL VENOUS BLD VENIPUNCTURE: CPT

## 2021-10-12 PROCEDURE — 80053 COMPREHEN METABOLIC PANEL: CPT

## 2021-10-12 PROCEDURE — 85027 COMPLETE CBC AUTOMATED: CPT

## 2021-10-12 PROCEDURE — 80061 LIPID PANEL: CPT

## 2021-10-12 PROCEDURE — 84443 ASSAY THYROID STIM HORMONE: CPT

## 2021-10-12 PROCEDURE — 82570 ASSAY OF URINE CREATININE: CPT

## 2021-12-29 ENCOUNTER — LAB ENCOUNTER (OUTPATIENT)
Dept: LAB | Age: 66
End: 2021-12-29
Attending: INTERNAL MEDICINE
Payer: MEDICARE

## 2021-12-29 ENCOUNTER — NURSE TRIAGE (OUTPATIENT)
Dept: INTERNAL MEDICINE CLINIC | Facility: CLINIC | Age: 66
End: 2021-12-29

## 2021-12-29 DIAGNOSIS — Z20.822 EXPOSURE TO CONFIRMED CASE OF COVID-19: Primary | ICD-10-CM

## 2021-12-29 DIAGNOSIS — Z20.822 EXPOSURE TO CONFIRMED CASE OF COVID-19: ICD-10-CM

## 2021-12-29 NOTE — TELEPHONE ENCOUNTER
Action Requested: Summary for Provider     []  Critical Lab, Recommendations Needed  [] Need Additional Advice  [x]   FYI    []   Need Orders  [] Need Medications Sent to Pharmacy  []  Other     SUMMARY: Patient stated that his wife is currently admitted a

## 2021-12-31 LAB — SARS-COV-2 RNA RESP QL NAA+PROBE: DETECTED

## 2021-12-31 NOTE — TELEPHONE ENCOUNTER
Patient called office. RN spoke with patient. Patient's date of birth and full name both confirmed. Patient inquiring about what to do as he awaits the PCR test results from 12/29/21.      With positive COVID test using at-home COVID test on 12/28/2

## 2022-02-08 ENCOUNTER — OFFICE VISIT (OUTPATIENT)
Dept: INTERNAL MEDICINE CLINIC | Facility: CLINIC | Age: 67
End: 2022-02-08
Payer: MEDICARE

## 2022-02-08 ENCOUNTER — LAB ENCOUNTER (OUTPATIENT)
Dept: LAB | Facility: HOSPITAL | Age: 67
End: 2022-02-08
Attending: INTERNAL MEDICINE
Payer: MEDICARE

## 2022-02-08 VITALS
WEIGHT: 215 LBS | HEIGHT: 70 IN | BODY MASS INDEX: 30.78 KG/M2 | HEART RATE: 84 BPM | OXYGEN SATURATION: 97 % | DIASTOLIC BLOOD PRESSURE: 78 MMHG | RESPIRATION RATE: 14 BRPM | SYSTOLIC BLOOD PRESSURE: 136 MMHG

## 2022-02-08 DIAGNOSIS — R93.1 ABNORMAL HEART SCORE CT: ICD-10-CM

## 2022-02-08 DIAGNOSIS — Z23 NEED FOR PNEUMOCOCCAL VACCINATION: ICD-10-CM

## 2022-02-08 DIAGNOSIS — Z12.5 SCREENING PSA (PROSTATE SPECIFIC ANTIGEN): ICD-10-CM

## 2022-02-08 DIAGNOSIS — E11.9 TYPE 2 DIABETES MELLITUS WITHOUT COMPLICATION, WITHOUT LONG-TERM CURRENT USE OF INSULIN (HCC): ICD-10-CM

## 2022-02-08 DIAGNOSIS — E11.40 CONTROLLED TYPE 2 DIABETES WITH NEUROPATHY (HCC): ICD-10-CM

## 2022-02-08 DIAGNOSIS — M23.90 INTERNAL DERANGEMENT OF KNEE, UNSPECIFIED LATERALITY: ICD-10-CM

## 2022-02-08 DIAGNOSIS — E55.9 VITAMIN D DEFICIENCY: ICD-10-CM

## 2022-02-08 DIAGNOSIS — E78.00 PURE HYPERCHOLESTEROLEMIA: Primary | ICD-10-CM

## 2022-02-08 DIAGNOSIS — N52.9 ERECTILE DYSFUNCTION, UNSPECIFIED ERECTILE DYSFUNCTION TYPE: ICD-10-CM

## 2022-02-08 DIAGNOSIS — G06.2 EPIDURAL ABSCESS: ICD-10-CM

## 2022-02-08 LAB
COMPLEXED PSA SERPL-MCNC: 0.9 NG/ML (ref ?–4)
CREAT UR-SCNC: 45.9 MG/DL
MICROALBUMIN UR-MCNC: 1.47 MG/DL
MICROALBUMIN/CREAT 24H UR-RTO: 32 UG/MG (ref ?–30)

## 2022-02-08 PROCEDURE — G0009 ADMIN PNEUMOCOCCAL VACCINE: HCPCS | Performed by: INTERNAL MEDICINE

## 2022-02-08 PROCEDURE — 36415 COLL VENOUS BLD VENIPUNCTURE: CPT

## 2022-02-08 PROCEDURE — 82043 UR ALBUMIN QUANTITATIVE: CPT

## 2022-02-08 PROCEDURE — 90732 PPSV23 VACC 2 YRS+ SUBQ/IM: CPT | Performed by: INTERNAL MEDICINE

## 2022-02-08 PROCEDURE — 82570 ASSAY OF URINE CREATININE: CPT

## 2022-02-08 PROCEDURE — G0439 PPPS, SUBSEQ VISIT: HCPCS | Performed by: INTERNAL MEDICINE

## 2022-02-14 ENCOUNTER — OFFICE VISIT (OUTPATIENT)
Dept: ENDOCRINOLOGY CLINIC | Facility: CLINIC | Age: 67
End: 2022-02-14
Payer: MEDICARE

## 2022-02-14 ENCOUNTER — LAB ENCOUNTER (OUTPATIENT)
Dept: LAB | Facility: HOSPITAL | Age: 67
End: 2022-02-14
Attending: INTERNAL MEDICINE
Payer: MEDICARE

## 2022-02-14 ENCOUNTER — TELEPHONE (OUTPATIENT)
Dept: ENDOCRINOLOGY CLINIC | Facility: CLINIC | Age: 67
End: 2022-02-14

## 2022-02-14 ENCOUNTER — TELEPHONE (OUTPATIENT)
Dept: INTERNAL MEDICINE CLINIC | Facility: CLINIC | Age: 67
End: 2022-02-14

## 2022-02-14 VITALS
DIASTOLIC BLOOD PRESSURE: 81 MMHG | WEIGHT: 216 LBS | BODY MASS INDEX: 31 KG/M2 | HEART RATE: 76 BPM | SYSTOLIC BLOOD PRESSURE: 138 MMHG

## 2022-02-14 DIAGNOSIS — E78.5 DYSLIPIDEMIA: ICD-10-CM

## 2022-02-14 DIAGNOSIS — E11.69 TYPE 2 DIABETES MELLITUS WITH OTHER SPECIFIED COMPLICATION, WITHOUT LONG-TERM CURRENT USE OF INSULIN (HCC): Primary | ICD-10-CM

## 2022-02-14 LAB
CARTRIDGE LOT#: ABNORMAL NUMERIC
CHOLEST SERPL-MCNC: 155 MG/DL (ref ?–200)
FASTING PATIENT LIPID ANSWER: YES
GLUCOSE BLOOD: 200
HDLC SERPL-MCNC: 31 MG/DL (ref 40–59)
HEMOGLOBIN A1C: 6.3 % (ref 4.3–5.6)
LDLC SERPL CALC-MCNC: 49 MG/DL (ref ?–100)
NONHDLC SERPL-MCNC: 124 MG/DL (ref ?–130)
TEST STRIP LOT #: NORMAL NUMERIC
TRIGL SERPL-MCNC: 512 MG/DL (ref 30–149)
VLDLC SERPL CALC-MCNC: 73 MG/DL (ref 0–30)

## 2022-02-14 PROCEDURE — 36416 COLLJ CAPILLARY BLOOD SPEC: CPT | Performed by: INTERNAL MEDICINE

## 2022-02-14 PROCEDURE — 99213 OFFICE O/P EST LOW 20 MIN: CPT | Performed by: INTERNAL MEDICINE

## 2022-02-14 PROCEDURE — 83036 HEMOGLOBIN GLYCOSYLATED A1C: CPT | Performed by: INTERNAL MEDICINE

## 2022-02-14 PROCEDURE — 36415 COLL VENOUS BLD VENIPUNCTURE: CPT

## 2022-02-14 PROCEDURE — 80061 LIPID PANEL: CPT

## 2022-02-14 PROCEDURE — 82947 ASSAY GLUCOSE BLOOD QUANT: CPT | Performed by: INTERNAL MEDICINE

## 2022-02-14 NOTE — TELEPHONE ENCOUNTER
TG are elevated over 500.  Please make sure he was fasting for 8-10 hours at the time of blood draw  When these are elevated, they can increase risk of pancreatitis  Hence, recommend:   - Low fat diet  - Start fenofibrate 134 mg daily  He is also on a statin, if he notices any muscle cramps should call  Also, we will monitor his liver function and repeat a lipid panel in three months , fasting    Thanks

## 2022-02-14 NOTE — TELEPHONE ENCOUNTER
I have placed the order for x-ray of the lumbosacral spine. However if the pain is bad, it is better to be evaluated in the emergency room as we will not be able to  abscess or infection by x-ray.   Please inform patient thank you

## 2022-02-14 NOTE — TELEPHONE ENCOUNTER
Informed patient of x-ray and advice per Dr. Emily Garcia.  Patient states his pain currently is 4/10. Advised to take Ibuprofen, and to proceed to ER if pain increases. States understanding.

## 2022-02-14 NOTE — TELEPHONE ENCOUNTER
Pt came to  asking if Dr Christian Gerber would put in an order for a back xray. Pt states he had an epidural abscess in the past and is experiencing tremendous pain. . Pt would like to be advise as to whether he should go to ER or see Dr Christian Gerber in person.  Please advise

## 2022-02-15 ENCOUNTER — HOSPITAL ENCOUNTER (OUTPATIENT)
Dept: GENERAL RADIOLOGY | Facility: HOSPITAL | Age: 67
Discharge: HOME | End: 2022-02-15
Attending: INTERNAL MEDICINE
Payer: MEDICARE

## 2022-02-15 DIAGNOSIS — M54.50 LOW BACK PAIN, UNSPECIFIED BACK PAIN LATERALITY, UNSPECIFIED CHRONICITY, UNSPECIFIED WHETHER SCIATICA PRESENT: ICD-10-CM

## 2022-02-15 PROCEDURE — 72110 X-RAY EXAM L-2 SPINE 4/>VWS: CPT | Performed by: INTERNAL MEDICINE

## 2022-03-04 ENCOUNTER — APPOINTMENT (OUTPATIENT)
Dept: CT IMAGING | Facility: HOSPITAL | Age: 67
End: 2022-03-04
Attending: EMERGENCY MEDICINE
Payer: MEDICARE

## 2022-03-04 ENCOUNTER — HOSPITAL ENCOUNTER (EMERGENCY)
Facility: HOSPITAL | Age: 67
Discharge: HOME | End: 2022-03-04
Attending: EMERGENCY MEDICINE
Payer: MEDICARE

## 2022-03-04 VITALS
TEMPERATURE: 98 F | SYSTOLIC BLOOD PRESSURE: 167 MMHG | HEART RATE: 97 BPM | HEIGHT: 70 IN | WEIGHT: 216.06 LBS | OXYGEN SATURATION: 98 % | BODY MASS INDEX: 30.93 KG/M2 | DIASTOLIC BLOOD PRESSURE: 81 MMHG | RESPIRATION RATE: 18 BRPM

## 2022-03-04 DIAGNOSIS — S01.81XA FACIAL LACERATION, INITIAL ENCOUNTER: Primary | ICD-10-CM

## 2022-03-04 DIAGNOSIS — S09.90XA INJURY OF HEAD, INITIAL ENCOUNTER: ICD-10-CM

## 2022-03-04 PROCEDURE — 12016 RPR FE/E/EN/L/M 12.6-20.0 CM: CPT

## 2022-03-04 PROCEDURE — 70450 CT HEAD/BRAIN W/O DYE: CPT | Performed by: EMERGENCY MEDICINE

## 2022-03-04 PROCEDURE — 99284 EMERGENCY DEPT VISIT MOD MDM: CPT

## 2022-03-04 NOTE — ED INITIAL ASSESSMENT (HPI)
Fall down 2 steps while attempting to move a  rodding machine down steps. Hit in head with metal handle of machine. Pinned under machine requiring fire department to remove. Large lac to forehead.  No LOC no blood thinners

## 2022-03-10 ENCOUNTER — OFFICE VISIT (OUTPATIENT)
Dept: INTERNAL MEDICINE CLINIC | Facility: CLINIC | Age: 67
End: 2022-03-10
Payer: MEDICARE

## 2022-03-10 VITALS
OXYGEN SATURATION: 98 % | WEIGHT: 223 LBS | DIASTOLIC BLOOD PRESSURE: 80 MMHG | RESPIRATION RATE: 14 BRPM | BODY MASS INDEX: 31.92 KG/M2 | SYSTOLIC BLOOD PRESSURE: 138 MMHG | HEIGHT: 70 IN | HEART RATE: 88 BPM

## 2022-03-10 DIAGNOSIS — E11.40 CONTROLLED TYPE 2 DIABETES WITH NEUROPATHY (HCC): ICD-10-CM

## 2022-03-10 DIAGNOSIS — S01.01XS LACERATION OF SCALP, SEQUELA: Primary | ICD-10-CM

## 2022-03-10 PROCEDURE — 99214 OFFICE O/P EST MOD 30 MIN: CPT | Performed by: INTERNAL MEDICINE

## 2022-03-10 RX ORDER — LISINOPRIL 5 MG/1
5 TABLET ORAL DAILY
Qty: 90 TABLET | Refills: 1 | Status: SHIPPED | OUTPATIENT
Start: 2022-03-10 | End: 2022-06-08

## 2022-03-16 RX ORDER — FENOFIBRATE 134 MG/1
CAPSULE ORAL
Qty: 90 CAPSULE | Refills: 0 | Status: SHIPPED | OUTPATIENT
Start: 2022-03-16

## 2022-03-16 NOTE — TELEPHONE ENCOUNTER
rn called patient with message below by dr Mary Arnold, patient said he used to be on fenofibrate before by pcp but lower triglycerides too much  So pcp stopped states he has to change his way of eating ,eats to much candy.   Patient said to send rx   Labs ordered

## 2022-04-26 DIAGNOSIS — E11.9 TYPE 2 DIABETES MELLITUS WITHOUT COMPLICATION, WITHOUT LONG-TERM CURRENT USE OF INSULIN (HCC): ICD-10-CM

## 2022-04-26 NOTE — TELEPHONE ENCOUNTER
Refill passed per [place] [employer] [facility] protocol.     Requested Prescriptions   Pending Prescriptions Disp Refills    METFORMIN 500 MG Oral Tab [Pharmacy Med Name: metFORMIN HCl Oral Tablet 500 MG] 360 tablet 0     Sig: TAKE 2 TABLETS BY MOUTH TWO TIMES A DAY WITH MEALS        Diabetes Medication Protocol Passed - 4/26/2022 12:13 PM        Passed - Last A1C < 7.5 and within past 6 months     Lab Results   Component Value Date    A1C 6.3 (A) 02/14/2022               Passed - Appointment in past 6 or next 3 months        Passed - GFR Non- > 50     Lab Results   Component Value Date    GFRNAA 91 10/12/2021                 Passed - GFR in the past 12 months              Recent Outpatient Visits              1 month ago Laceration of scalp, sequela    [facility], [address] MD    Office Visit    2 months ago Type 2 diabetes mellitus with other specified complication, without long-term current use of insulin [facility])    [facility] Endocrinology Hope Alpers, MD    Office Visit    2 months ago Pure hypercholesterolemia    James Gant MD    Office Visit    6 months ago Pure hypercholesterolemia    [facility], [address] MD    Office Visit    7 months ago Type 2 diabetes mellitus with other specified complication, without long-term current use of insulin [facility])    [facility] Endocrinology Hope Alpers, MD    Office Visit            Future Appointments         Provider Department Appt Notes    In 3 months Ree Puga MD [facility], [address], [place] 5-6 Month Follow Up    In 3 months Hope Alpers, [address] Endocrinology 6  month fu

## 2022-06-08 RX ORDER — GLIMEPIRIDE 2 MG/1
2 TABLET ORAL
Qty: 90 TABLET | Refills: 1 | Status: SHIPPED | OUTPATIENT
Start: 2022-06-08

## 2022-06-08 NOTE — TELEPHONE ENCOUNTER
Refill passed per [place] [employer] [id number] [facility] protocol.      Requested Prescriptions   Pending Prescriptions Disp Refills    GLIMEPIRIDE 2 MG Oral Tab [Pharmacy Med Name: Glimepiride Oral Tablet 2 MG] 90 tablet 0     Sig: TAKE 1 TABLET BY MOUTH EVERY DAY WITH BREAKFAST        Diabetes Medication Protocol Passed - 6/8/2022  9:38 AM        Passed - Last A1C < 7.5 and within past 6 months     Lab Results   Component Value Date    A1C 6.3 (A) 02/14/2022               Passed - Appointment in past 6 or next 3 months        Passed - GFR Non- > 50     Lab Results   Component Value Date    GFRNAA 91 10/12/2021                 Passed - GFR in the past 12 months               Recent Outpatient Visits              3 months ago Laceration of scalp, sequela    [facility], [address], Eddie Baltazar MD    Office Visit    3 months ago Type 2 diabetes mellitus with other specified complication, without long-term current use of insulin [facility])    [facility] Endocrinology Delmer Roe MD    Office Visit    4 months ago Pure hypercholesterolemia    Rojean Dad, Eddie Baltazar MD    Office Visit    8 months ago Pure hypercholesterolemia    [facility], [address], Eddie Baltazar MD    Office Visit    8 months ago Type 2 diabetes mellitus with other specified complication, without long-term current use of insulin [facility])    [facility] Endocrinology Delmer Roe MD    Office Visit             Future Appointments         Provider Department Appt Notes    In 2 months Paco Glasgow MD [facility], [address], [address] 5-6 Month Follow Up    In 2 months Delmer Roe, [address] [place] Endocrinology 6  month fu

## 2022-07-16 DIAGNOSIS — E78.00 PURE HYPERCHOLESTEROLEMIA: ICD-10-CM

## 2022-07-18 RX ORDER — ATORVASTATIN CALCIUM 10 MG/1
TABLET, FILM COATED ORAL
Qty: 90 TABLET | Refills: 0 | Status: SHIPPED | OUTPATIENT
Start: 2022-07-18

## 2022-08-08 RX ORDER — FENOFIBRATE 134 MG/1
CAPSULE ORAL
Qty: 90 CAPSULE | Refills: 0 | Status: SHIPPED | OUTPATIENT
Start: 2022-08-08

## 2022-08-09 ENCOUNTER — OFFICE VISIT (OUTPATIENT)
Facility: CLINIC | Age: 67
End: 2022-08-09
Payer: MEDICARE

## 2022-08-09 VITALS
HEIGHT: 70 IN | BODY MASS INDEX: 30.64 KG/M2 | DIASTOLIC BLOOD PRESSURE: 60 MMHG | HEART RATE: 74 BPM | WEIGHT: 214 LBS | SYSTOLIC BLOOD PRESSURE: 126 MMHG | RESPIRATION RATE: 14 BRPM | OXYGEN SATURATION: 98 %

## 2022-08-09 DIAGNOSIS — E78.5 DYSLIPIDEMIA: ICD-10-CM

## 2022-08-09 DIAGNOSIS — E11.9 TYPE 2 DIABETES MELLITUS WITHOUT COMPLICATION, WITHOUT LONG-TERM CURRENT USE OF INSULIN (HCC): Primary | ICD-10-CM

## 2022-08-09 DIAGNOSIS — S93.401S SPRAIN OF RIGHT ANKLE, UNSPECIFIED LIGAMENT, SEQUELA: ICD-10-CM

## 2022-08-09 PROCEDURE — 1126F AMNT PAIN NOTED NONE PRSNT: CPT | Performed by: INTERNAL MEDICINE

## 2022-08-09 PROCEDURE — 99214 OFFICE O/P EST MOD 30 MIN: CPT | Performed by: INTERNAL MEDICINE

## 2022-08-09 RX ORDER — LISINOPRIL 2.5 MG/1
2.5 TABLET ORAL DAILY
Qty: 90 TABLET | Refills: 1 | Status: SHIPPED | OUTPATIENT
Start: 2022-08-09 | End: 2022-11-07

## 2022-08-15 ENCOUNTER — LAB ENCOUNTER (OUTPATIENT)
Dept: LAB | Facility: HOSPITAL | Age: 67
End: 2022-08-15
Attending: INTERNAL MEDICINE
Payer: MEDICARE

## 2022-08-15 ENCOUNTER — OFFICE VISIT (OUTPATIENT)
Dept: ENDOCRINOLOGY CLINIC | Facility: CLINIC | Age: 67
End: 2022-08-15
Payer: MEDICARE

## 2022-08-15 VITALS
HEART RATE: 72 BPM | WEIGHT: 214 LBS | BODY MASS INDEX: 31 KG/M2 | DIASTOLIC BLOOD PRESSURE: 73 MMHG | SYSTOLIC BLOOD PRESSURE: 129 MMHG

## 2022-08-15 DIAGNOSIS — E78.5 DYSLIPIDEMIA: ICD-10-CM

## 2022-08-15 DIAGNOSIS — E11.69 TYPE 2 DIABETES MELLITUS WITH OTHER SPECIFIED COMPLICATION, WITHOUT LONG-TERM CURRENT USE OF INSULIN (HCC): Primary | ICD-10-CM

## 2022-08-15 DIAGNOSIS — E11.69 TYPE 2 DIABETES MELLITUS WITH OTHER SPECIFIED COMPLICATION, WITHOUT LONG-TERM CURRENT USE OF INSULIN (HCC): ICD-10-CM

## 2022-08-15 LAB
ALBUMIN SERPL-MCNC: 3.6 G/DL (ref 3.4–5)
ALBUMIN/GLOB SERPL: 0.9 {RATIO} (ref 1–2)
ALP LIVER SERPL-CCNC: 34 U/L
ALT SERPL-CCNC: 45 U/L
ANION GAP SERPL CALC-SCNC: 4 MMOL/L (ref 0–18)
AST SERPL-CCNC: 29 U/L (ref 15–37)
BILIRUB SERPL-MCNC: 0.7 MG/DL (ref 0.1–2)
BUN BLD-MCNC: 13 MG/DL (ref 7–18)
BUN/CREAT SERPL: 12.1 (ref 10–20)
CALCIUM BLD-MCNC: 9.5 MG/DL (ref 8.5–10.1)
CARTRIDGE LOT#: ABNORMAL NUMERIC
CHLORIDE SERPL-SCNC: 105 MMOL/L (ref 98–112)
CHOLEST SERPL-MCNC: 100 MG/DL (ref ?–200)
CO2 SERPL-SCNC: 27 MMOL/L (ref 21–32)
CREAT BLD-MCNC: 1.07 MG/DL
CREAT UR-SCNC: 69.6 MG/DL
FASTING PATIENT LIPID ANSWER: YES
FASTING STATUS PATIENT QL REPORTED: YES
GFR SERPLBLD BASED ON 1.73 SQ M-ARVRAT: 76 ML/MIN/1.73M2 (ref 60–?)
GLOBULIN PLAS-MCNC: 4.1 G/DL (ref 2.8–4.4)
GLUCOSE BLD-MCNC: 162 MG/DL (ref 70–99)
GLUCOSE BLOOD: 184
HDLC SERPL-MCNC: 34 MG/DL (ref 40–59)
HEMOGLOBIN A1C: 6.7 % (ref 4.3–5.6)
LDLC SERPL CALC-MCNC: 25 MG/DL (ref ?–100)
MICROALBUMIN UR-MCNC: 1.3 MG/DL
MICROALBUMIN/CREAT 24H UR-RTO: 18.7 UG/MG (ref ?–30)
NONHDLC SERPL-MCNC: 66 MG/DL (ref ?–130)
OSMOLALITY SERPL CALC.SUM OF ELEC: 286 MOSM/KG (ref 275–295)
POTASSIUM SERPL-SCNC: 4.5 MMOL/L (ref 3.5–5.1)
PROT SERPL-MCNC: 7.7 G/DL (ref 6.4–8.2)
SODIUM SERPL-SCNC: 136 MMOL/L (ref 136–145)
TEST STRIP LOT #: NORMAL NUMERIC
TRIGL SERPL-MCNC: 272 MG/DL (ref 30–149)
VLDLC SERPL CALC-MCNC: 36 MG/DL (ref 0–30)

## 2022-08-15 PROCEDURE — 36415 COLL VENOUS BLD VENIPUNCTURE: CPT

## 2022-08-15 PROCEDURE — 82043 UR ALBUMIN QUANTITATIVE: CPT

## 2022-08-15 PROCEDURE — 80053 COMPREHEN METABOLIC PANEL: CPT

## 2022-08-15 PROCEDURE — 99214 OFFICE O/P EST MOD 30 MIN: CPT | Performed by: INTERNAL MEDICINE

## 2022-08-15 PROCEDURE — 80061 LIPID PANEL: CPT

## 2022-08-15 PROCEDURE — 83036 HEMOGLOBIN GLYCOSYLATED A1C: CPT | Performed by: INTERNAL MEDICINE

## 2022-08-15 PROCEDURE — 82947 ASSAY GLUCOSE BLOOD QUANT: CPT | Performed by: INTERNAL MEDICINE

## 2022-08-15 PROCEDURE — 82570 ASSAY OF URINE CREATININE: CPT

## 2022-10-13 DIAGNOSIS — E78.00 PURE HYPERCHOLESTEROLEMIA: ICD-10-CM

## 2022-10-13 RX ORDER — ATORVASTATIN CALCIUM 10 MG/1
10 TABLET, FILM COATED ORAL EVERY EVENING
Qty: 90 TABLET | Refills: 1 | Status: SHIPPED | OUTPATIENT
Start: 2022-10-13

## 2022-10-13 NOTE — TELEPHONE ENCOUNTER
Refill passed per [facility] protocol   Requested Prescriptions   Pending Prescriptions Disp Refills    ATORVASTATIN 10 MG Oral Tab [Pharmacy Med Name: Atorvastatin Calcium Oral Tablet 10 MG] 90 tablet 0     Sig: TAKE 1 TABLET BY MOUTH IN THE EVENING        Cholesterol Medication Protocol Passed - 10/13/2022 11:38 AM        Passed - ALT in past 12 months        Passed - LDL in past 12 months        Passed - Last ALT < 80       Lab Results   Component Value Date    ALT 45 08/15/2022             Passed - Last LDL < 130     Lab Results   Component Value Date    LDL 25 08/15/2022               Passed - In person appointment or virtual visit in the past 12 mos or appointment in next 3 mos       Recent Outpatient Visits              1 month ago Type 2 diabetes mellitus with other specified complication, without long-term current use of insulin [facility])    [facility] Endocrinology Jan Markham MD    Office Visit    2 months ago Type 2 diabetes mellitus without complication, without long-term current use of insulin [facility])    Selene Shirley MD    Office Visit    7 months ago Laceration of scalp, sequela    [facility] Carin Leonard [place], Briseida Hendrickson MD    Office Visit    8 months ago Type 2 diabetes mellitus with other specified complication, without long-term current use of insulin [facility])    [address] Endocrinology Jan Marhkam MD    Office Visit    8 months ago Pure hypercholesterolemia    [facility], Ginger Jean MD    Office Visit     Future Appointments         Provider Department Appt Notes    In 2 months Jan Markham, [address] [facility] Endocrinology 4 month fu     In 2 months Colten Ocasio MD [facility] [address] [place], [address] [place] [address]

## 2022-11-02 RX ORDER — FENOFIBRATE 134 MG/1
CAPSULE ORAL
Qty: 90 CAPSULE | Refills: 0 | Status: SHIPPED | OUTPATIENT
Start: 2022-11-02

## 2022-12-12 RX ORDER — GLIMEPIRIDE 2 MG/1
2 TABLET ORAL
Qty: 90 TABLET | Refills: 1 | Status: SHIPPED | OUTPATIENT
Start: 2022-12-12

## 2022-12-12 NOTE — TELEPHONE ENCOUNTER
Refill passed per [place] [employer] [facility] protocol.      Requested Prescriptions   Pending Prescriptions Disp Refills    GLIMEPIRIDE 2 MG Oral Tab [Pharmacy Med Name: Glimepiride Oral Tablet 2 MG] 90 tablet 0     Sig: TAKE 1 TABLET BY MOUTH EVERY DAY WITH BREAKFAST       Diabetes Medication Protocol Passed - 12/12/2022 10:36 AM        Passed - Last A1C < 7.5 and within past 6 months     Lab Results   Component Value Date    A1C 6.7 (A) 08/15/2022             Passed - In person appointment or virtual visit in the past 6 mos or appointment in next 3 mos     Recent Outpatient Visits              3 months ago Type 2 diabetes mellitus with other specified complication, without long-term current use of insulin [facility])    [facility] Endocrinology Skylar Mccormick MD    Office Visit    4 months ago Type 2 diabetes mellitus without complication, without long-term current use of insulin [facility])    [facility], [address] Jil Ernandez MD    Office Visit    9 months ago Laceration of scalp, sequela    [facility], [address], [facility] Kwadwo Lim MD    Office Visit    10 months ago Type 2 diabetes mellitus with other specified complication, without long-term current use of insulin [facility])    [facility] Endocrinology Skylar Mccormick MD    Office Visit    10 months ago Pure hypercholesterolemia    Hunter Berger, Jil Vallejo MD    Office Visit          Future Appointments         Provider Department Appt Notes    In 1 week Skylar Mccormick MD [place] [facility], [address] [facility] 4 month fu     In 4 weeks Summer Tidwell MD [facility], [address] or GFRNAA > 50     GFR Evaluation  EGFRCR: 76 , resulted on 8/15/2022          Passed - GFR in the past 12 months              Future Appointments         Provider Department Appt Notes    In 1 week Skylar Mccormick MD [place] [facility], [address] [facility] 4 month fu In 4 weeks Danny Peterson MD [address] [place], [facility]              Recent Outpatient Visits              3 months ago Type 2 diabetes mellitus with other specified complication, without long-term current use of insulin [facility])    [address] [place] Endocrinology Davis Lord MD    Office Visit    4 months ago Type 2 diabetes mellitus without complication, without long-term current use of insulin [facility])    Diane Cao MD    Office Visit    9 months ago Laceration of scalp, sequela    [address], MD    Office Visit    10 months ago Type 2 diabetes mellitus with other specified complication, without long-term current use of insulin [facility])    [address] Endocrinology Davis Lord MD    Office Visit    10 months ago Pure hypercholesterolemia    James Wagner MD    Office Visit

## 2022-12-19 ENCOUNTER — OFFICE VISIT (OUTPATIENT)
Dept: ENDOCRINOLOGY CLINIC | Facility: CLINIC | Age: 67
End: 2022-12-19
Payer: MEDICARE

## 2022-12-19 VITALS
DIASTOLIC BLOOD PRESSURE: 82 MMHG | SYSTOLIC BLOOD PRESSURE: 165 MMHG | HEART RATE: 73 BPM | BODY MASS INDEX: 31 KG/M2 | WEIGHT: 216 LBS

## 2022-12-19 DIAGNOSIS — E11.69 TYPE 2 DIABETES MELLITUS WITH OTHER SPECIFIED COMPLICATION, WITHOUT LONG-TERM CURRENT USE OF INSULIN (HCC): Primary | ICD-10-CM

## 2022-12-19 DIAGNOSIS — E78.5 DYSLIPIDEMIA: ICD-10-CM

## 2022-12-19 LAB
CARTRIDGE LOT#: ABNORMAL NUMERIC
GLUCOSE BLOOD: 160
HEMOGLOBIN A1C: 6.2 % (ref 4.3–5.6)
TEST STRIP LOT #: NORMAL NUMERIC

## 2022-12-19 PROCEDURE — 83036 HEMOGLOBIN GLYCOSYLATED A1C: CPT | Performed by: INTERNAL MEDICINE

## 2022-12-19 PROCEDURE — 82947 ASSAY GLUCOSE BLOOD QUANT: CPT | Performed by: INTERNAL MEDICINE

## 2022-12-19 PROCEDURE — 99213 OFFICE O/P EST LOW 20 MIN: CPT | Performed by: INTERNAL MEDICINE

## 2023-01-10 ENCOUNTER — OFFICE VISIT (OUTPATIENT)
Facility: CLINIC | Age: 68
End: 2023-01-10
Payer: COMMERCIAL

## 2023-01-10 VITALS
RESPIRATION RATE: 14 BRPM | OXYGEN SATURATION: 98 % | HEART RATE: 72 BPM | HEIGHT: 70 IN | SYSTOLIC BLOOD PRESSURE: 130 MMHG | DIASTOLIC BLOOD PRESSURE: 72 MMHG | WEIGHT: 212 LBS | BODY MASS INDEX: 30.35 KG/M2

## 2023-01-10 DIAGNOSIS — N52.9 ERECTILE DYSFUNCTION, UNSPECIFIED ERECTILE DYSFUNCTION TYPE: ICD-10-CM

## 2023-01-10 DIAGNOSIS — R07.89 ATYPICAL CHEST PAIN: ICD-10-CM

## 2023-01-10 DIAGNOSIS — M23.90 INTERNAL DERANGEMENT OF KNEE, UNSPECIFIED LATERALITY: ICD-10-CM

## 2023-01-10 DIAGNOSIS — E11.9 TYPE 2 DIABETES MELLITUS WITHOUT COMPLICATION, WITHOUT LONG-TERM CURRENT USE OF INSULIN (HCC): Primary | ICD-10-CM

## 2023-01-10 DIAGNOSIS — K43.9 VENTRAL HERNIA WITHOUT OBSTRUCTION OR GANGRENE: ICD-10-CM

## 2023-01-10 DIAGNOSIS — R93.1 ABNORMAL HEART SCORE CT: ICD-10-CM

## 2023-01-10 DIAGNOSIS — E78.00 PURE HYPERCHOLESTEROLEMIA: ICD-10-CM

## 2023-01-10 DIAGNOSIS — I10 HYPERTENSION, UNSPECIFIED TYPE: ICD-10-CM

## 2023-01-10 DIAGNOSIS — E55.9 VITAMIN D DEFICIENCY: ICD-10-CM

## 2023-01-10 DIAGNOSIS — Z12.5 SCREENING PSA (PROSTATE SPECIFIC ANTIGEN): ICD-10-CM

## 2023-01-10 DIAGNOSIS — G06.2 EPIDURAL ABSCESS: ICD-10-CM

## 2023-01-10 PROBLEM — E11.40 CONTROLLED TYPE 2 DIABETES WITH NEUROPATHY (HCC): Status: RESOLVED | Noted: 2022-02-08 | Resolved: 2023-01-10

## 2023-01-10 PROCEDURE — 99397 PER PM REEVAL EST PAT 65+ YR: CPT | Performed by: INTERNAL MEDICINE

## 2023-01-10 PROCEDURE — 3075F SYST BP GE 130 - 139MM HG: CPT | Performed by: INTERNAL MEDICINE

## 2023-01-10 PROCEDURE — 1126F AMNT PAIN NOTED NONE PRSNT: CPT | Performed by: INTERNAL MEDICINE

## 2023-01-10 PROCEDURE — 96160 PT-FOCUSED HLTH RISK ASSMT: CPT | Performed by: INTERNAL MEDICINE

## 2023-01-10 PROCEDURE — G0439 PPPS, SUBSEQ VISIT: HCPCS | Performed by: INTERNAL MEDICINE

## 2023-01-10 PROCEDURE — 3078F DIAST BP <80 MM HG: CPT | Performed by: INTERNAL MEDICINE

## 2023-01-10 PROCEDURE — 3008F BODY MASS INDEX DOCD: CPT | Performed by: INTERNAL MEDICINE

## 2023-01-10 RX ORDER — ATORVASTATIN CALCIUM 10 MG/1
10 TABLET, FILM COATED ORAL EVERY EVENING
Qty: 90 TABLET | Refills: 3 | Status: SHIPPED | OUTPATIENT
Start: 2023-01-10

## 2023-01-12 ENCOUNTER — EKG ENCOUNTER (OUTPATIENT)
Dept: LAB | Age: 68
End: 2023-01-12
Attending: INTERNAL MEDICINE
Payer: MEDICARE

## 2023-01-12 DIAGNOSIS — R07.89 ATYPICAL CHEST PAIN: ICD-10-CM

## 2023-01-12 LAB
ATRIAL RATE: 79 BPM
P AXIS: -1 DEGREES
P-R INTERVAL: 176 MS
Q-T INTERVAL: 378 MS
QRS DURATION: 118 MS
QTC CALCULATION (BEZET): 433 MS
R AXIS: -45 DEGREES
T AXIS: 49 DEGREES
VENTRICULAR RATE: 79 BPM

## 2023-01-12 PROCEDURE — 93005 ELECTROCARDIOGRAM TRACING: CPT

## 2023-01-12 PROCEDURE — 93010 ELECTROCARDIOGRAM REPORT: CPT | Performed by: INTERNAL MEDICINE

## 2023-01-13 ENCOUNTER — LAB ENCOUNTER (OUTPATIENT)
Dept: LAB | Age: 68
End: 2023-01-13
Attending: INTERNAL MEDICINE
Payer: MEDICARE

## 2023-01-13 DIAGNOSIS — E11.9 TYPE 2 DIABETES MELLITUS WITHOUT COMPLICATION, WITHOUT LONG-TERM CURRENT USE OF INSULIN (HCC): ICD-10-CM

## 2023-01-13 DIAGNOSIS — I10 HYPERTENSION, UNSPECIFIED TYPE: ICD-10-CM

## 2023-01-13 DIAGNOSIS — Z12.5 SCREENING PSA (PROSTATE SPECIFIC ANTIGEN): ICD-10-CM

## 2023-01-13 DIAGNOSIS — E78.5 DYSLIPIDEMIA: ICD-10-CM

## 2023-01-13 LAB
CHOLEST SERPL-MCNC: 106 MG/DL (ref ?–200)
COMPLEXED PSA SERPL-MCNC: 0.96 NG/ML (ref ?–4)
CREAT UR-SCNC: 64.2 MG/DL
DEPRECATED RDW RBC AUTO: 39.5 FL (ref 35.1–46.3)
ERYTHROCYTE [DISTWIDTH] IN BLOOD BY AUTOMATED COUNT: 13.2 % (ref 11–15)
FASTING PATIENT LIPID ANSWER: YES
HCT VFR BLD AUTO: 38.5 %
HDLC SERPL-MCNC: 31 MG/DL (ref 40–59)
HGB BLD-MCNC: 13 G/DL
LDLC SERPL CALC-MCNC: 35 MG/DL (ref ?–100)
MCH RBC QN AUTO: 28 PG (ref 26–34)
MCHC RBC AUTO-ENTMCNC: 33.8 G/DL (ref 31–37)
MCV RBC AUTO: 82.8 FL
MICROALBUMIN UR-MCNC: 1.15 MG/DL
MICROALBUMIN/CREAT 24H UR-RTO: 17.9 UG/MG (ref ?–30)
NONHDLC SERPL-MCNC: 75 MG/DL (ref ?–130)
PLATELET # BLD AUTO: 188 10(3)UL (ref 150–450)
RBC # BLD AUTO: 4.65 X10(6)UL
TRIGL SERPL-MCNC: 258 MG/DL (ref 30–149)
TSI SER-ACNC: 3.21 MIU/ML (ref 0.36–3.74)
VLDLC SERPL CALC-MCNC: 35 MG/DL (ref 0–30)
WBC # BLD AUTO: 4.2 X10(3) UL (ref 4–11)

## 2023-01-13 PROCEDURE — 84443 ASSAY THYROID STIM HORMONE: CPT

## 2023-01-13 PROCEDURE — 3061F NEG MICROALBUMINURIA REV: CPT | Performed by: INTERNAL MEDICINE

## 2023-01-13 PROCEDURE — 36415 COLL VENOUS BLD VENIPUNCTURE: CPT

## 2023-01-13 PROCEDURE — 80061 LIPID PANEL: CPT

## 2023-01-13 PROCEDURE — 85027 COMPLETE CBC AUTOMATED: CPT

## 2023-01-13 PROCEDURE — 82043 UR ALBUMIN QUANTITATIVE: CPT

## 2023-01-13 PROCEDURE — 82570 ASSAY OF URINE CREATININE: CPT

## 2023-01-16 ENCOUNTER — PATIENT MESSAGE (OUTPATIENT)
Dept: ENDOCRINOLOGY CLINIC | Facility: CLINIC | Age: 68
End: 2023-01-16

## 2023-01-17 ENCOUNTER — HOSPITAL ENCOUNTER (OUTPATIENT)
Dept: NUCLEAR MEDICINE | Facility: HOSPITAL | Age: 68
Discharge: HOME | End: 2023-01-17
Attending: INTERNAL MEDICINE
Payer: MEDICARE

## 2023-01-17 ENCOUNTER — HOSPITAL ENCOUNTER (OUTPATIENT)
Dept: CV DIAGNOSTICS | Facility: HOSPITAL | Age: 68
Discharge: HOME | End: 2023-01-17
Attending: INTERNAL MEDICINE
Payer: MEDICARE

## 2023-01-17 DIAGNOSIS — R07.89 ATYPICAL CHEST PAIN: ICD-10-CM

## 2023-01-17 PROCEDURE — 93016 CV STRESS TEST SUPVJ ONLY: CPT | Performed by: INTERNAL MEDICINE

## 2023-01-17 PROCEDURE — 93018 CV STRESS TEST I&R ONLY: CPT | Performed by: INTERNAL MEDICINE

## 2023-01-17 PROCEDURE — 78452 HT MUSCLE IMAGE SPECT MULT: CPT | Performed by: INTERNAL MEDICINE

## 2023-01-17 PROCEDURE — 93017 CV STRESS TEST TRACING ONLY: CPT | Performed by: INTERNAL MEDICINE

## 2023-01-18 LAB
% OF MAX PREDICTED HR: 100 %
MAX DIASTOLIC BP: 68 MMHG
MAX HEART RATE: 146 BPM
MAX PREDICTED HEART RATE: 152 BPM
MAX SYSTOLIC BP: 182 MMHG
MAX WORK LOAD: 101

## 2023-01-28 RX ORDER — FENOFIBRATE 134 MG/1
CAPSULE ORAL
Qty: 90 CAPSULE | Refills: 0 | Status: SHIPPED | OUTPATIENT
Start: 2023-01-28

## 2023-01-31 RX ORDER — FENOFIBRATE 134 MG/1
CAPSULE ORAL
Qty: 90 CAPSULE | Refills: 0 | Status: SHIPPED | OUTPATIENT
Start: 2023-01-31

## 2023-02-15 ENCOUNTER — OFFICE VISIT (OUTPATIENT)
Dept: OPHTHALMOLOGY | Facility: CLINIC | Age: 68
End: 2023-02-15

## 2023-02-15 DIAGNOSIS — H25.13 AGE-RELATED NUCLEAR CATARACT OF BOTH EYES: ICD-10-CM

## 2023-02-15 DIAGNOSIS — E11.9 DIABETES MELLITUS TYPE 2 WITHOUT RETINOPATHY (HCC): Primary | ICD-10-CM

## 2023-02-15 PROCEDURE — 92004 COMPRE OPH EXAM NEW PT 1/>: CPT | Performed by: OPHTHALMOLOGY

## 2023-02-15 PROCEDURE — 2023F DILAT RTA XM W/O RTNOPTHY: CPT | Performed by: OPHTHALMOLOGY

## 2023-02-15 NOTE — PATIENT INSTRUCTIONS
Diabetes mellitus type 2 without retinopathy ([facility] [address]  Diabetes type II: no background of retinopathy, no signs of neovascularization noted. Discussed ocular and systemic benefits of blood sugar control. Diagnosis and treatment discussed in detail with patient. Age-related nuclear cataract of both eyes  Discussed mild cataracts in both eyes that are not affecting vision and are not surgical at this time.

## 2023-03-17 ENCOUNTER — TELEPHONE (OUTPATIENT)
Dept: INTERNAL MEDICINE CLINIC | Facility: CLINIC | Age: 68
End: 2023-03-17

## 2023-03-17 DIAGNOSIS — G47.33 OSA ON CPAP: Primary | ICD-10-CM

## 2023-03-17 DIAGNOSIS — Z99.89 OSA ON CPAP: Primary | ICD-10-CM

## 2023-03-17 NOTE — TELEPHONE ENCOUNTER
Pt wants a phone call and a mychart with a recommendation to a new pulmonologist at [facility] 143 from Dr Juancarlos Dumont.

## 2023-04-03 ENCOUNTER — TELEPHONE (OUTPATIENT)
Dept: PULMONOLOGY | Facility: CLINIC | Age: 68
End: 2023-04-03

## 2023-04-03 NOTE — TELEPHONE ENCOUNTER
Pt stopped at desk- pt has consult with Samantha Cedillo on 6/6  Has never seen any provider with [facility], pt st his CPAP equipment is broken and needs replacement - would like to be seen sooner if possible-    Pl call pt

## 2023-04-04 NOTE — TELEPHONE ENCOUNTER
Spoke with patient. Appointment scheduled for 4/11/23 at 11:30AM. Verified date, time, place. Patient verbalized understanding.

## 2023-04-11 ENCOUNTER — TELEPHONE (OUTPATIENT)
Dept: PULMONOLOGY | Facility: CLINIC | Age: 68
End: 2023-04-11

## 2023-04-11 ENCOUNTER — OFFICE VISIT (OUTPATIENT)
Dept: PULMONOLOGY | Facility: CLINIC | Age: 68
End: 2023-04-11

## 2023-04-11 VITALS
DIASTOLIC BLOOD PRESSURE: 76 MMHG | OXYGEN SATURATION: 98 % | HEIGHT: 70 IN | SYSTOLIC BLOOD PRESSURE: 148 MMHG | HEART RATE: 78 BPM | RESPIRATION RATE: 16 BRPM | WEIGHT: 212 LBS | BODY MASS INDEX: 30.35 KG/M2

## 2023-04-11 DIAGNOSIS — G47.33 OSA (OBSTRUCTIVE SLEEP APNEA): Primary | ICD-10-CM

## 2023-04-11 DIAGNOSIS — G47.33 OBSTRUCTIVE SLEEP APNEA: Primary | ICD-10-CM

## 2023-04-11 PROCEDURE — 1126F AMNT PAIN NOTED NONE PRSNT: CPT | Performed by: PHYSICIAN ASSISTANT

## 2023-04-11 PROCEDURE — 3008F BODY MASS INDEX DOCD: CPT | Performed by: PHYSICIAN ASSISTANT

## 2023-04-11 PROCEDURE — 3078F DIAST BP <80 MM HG: CPT | Performed by: PHYSICIAN ASSISTANT

## 2023-04-11 PROCEDURE — 3077F SYST BP >= 140 MM HG: CPT | Performed by: PHYSICIAN ASSISTANT

## 2023-04-11 PROCEDURE — 99203 OFFICE O/P NEW LOW 30 MIN: CPT | Performed by: PHYSICIAN ASSISTANT

## 2023-04-11 NOTE — PROGRESS NOTES
Faxed HANNAH to [phone] [facility] requesting Sleep Study Results.

## 2023-04-11 NOTE — TELEPHONE ENCOUNTER
Patient seen today and needs new CPAP with supplies. Current DME is [facility]. We are awaiting fax from [facility] with prior sleep study records. We will also need CPAP titration study or current CPAP pressure settings in order to place new order. RN: Please let me know once we have records so we can send order for new CPAP. Thank you!

## 2023-04-17 NOTE — TELEPHONE ENCOUNTER
Received copy of [facility] baseline sleep study 12/2/04 and CPAP titration 1/5/05 with CPAP of 14 cwp from [facility].      [place] Portal reports in your folder

## 2023-04-17 NOTE — TELEPHONE ENCOUNTER
Signed order for cpap, sleep study, office visit notes from 4/11/23 and face sheet faxed to [address] [facility] at [phone]. Received confirmation.

## 2023-04-20 ENCOUNTER — PATIENT MESSAGE (OUTPATIENT)
Dept: ENDOCRINOLOGY CLINIC | Facility: CLINIC | Age: 68
End: 2023-04-20

## 2023-04-20 DIAGNOSIS — E11.69 TYPE 2 DIABETES MELLITUS WITH OTHER SPECIFIED COMPLICATION, WITHOUT LONG-TERM CURRENT USE OF INSULIN (HCC): Primary | ICD-10-CM

## 2023-04-24 ENCOUNTER — OFFICE VISIT (OUTPATIENT)
Dept: ENDOCRINOLOGY CLINIC | Facility: CLINIC | Age: 68
End: 2023-04-24

## 2023-04-24 VITALS
HEART RATE: 75 BPM | DIASTOLIC BLOOD PRESSURE: 74 MMHG | WEIGHT: 219 LBS | SYSTOLIC BLOOD PRESSURE: 147 MMHG | HEIGHT: 70 IN | BODY MASS INDEX: 31.35 KG/M2

## 2023-04-24 DIAGNOSIS — E78.5 DYSLIPIDEMIA: ICD-10-CM

## 2023-04-24 DIAGNOSIS — E11.69 TYPE 2 DIABETES MELLITUS WITH OTHER SPECIFIED COMPLICATION, UNSPECIFIED WHETHER LONG TERM INSULIN USE (HCC): Primary | ICD-10-CM

## 2023-04-24 LAB
CARTRIDGE LOT#: ABNORMAL NUMERIC
GLUCOSE BLOOD: 184
HEMOGLOBIN A1C: 6.8 % (ref 4.3–5.6)
TEST STRIP LOT #: NORMAL NUMERIC

## 2023-04-24 PROCEDURE — 3008F BODY MASS INDEX DOCD: CPT | Performed by: INTERNAL MEDICINE

## 2023-04-24 PROCEDURE — 3077F SYST BP >= 140 MM HG: CPT | Performed by: INTERNAL MEDICINE

## 2023-04-24 PROCEDURE — 99213 OFFICE O/P EST LOW 20 MIN: CPT | Performed by: INTERNAL MEDICINE

## 2023-04-24 PROCEDURE — 3044F HG A1C LEVEL LT 7.0%: CPT | Performed by: INTERNAL MEDICINE

## 2023-04-24 PROCEDURE — 83036 HEMOGLOBIN GLYCOSYLATED A1C: CPT | Performed by: INTERNAL MEDICINE

## 2023-04-24 PROCEDURE — 82947 ASSAY GLUCOSE BLOOD QUANT: CPT | Performed by: INTERNAL MEDICINE

## 2023-04-24 PROCEDURE — 3078F DIAST BP <80 MM HG: CPT | Performed by: INTERNAL MEDICINE

## 2023-05-03 RX ORDER — FENOFIBRATE 134 MG/1
CAPSULE ORAL
Qty: 90 CAPSULE | Refills: 0 | Status: SHIPPED | OUTPATIENT
Start: 2023-05-03

## 2023-05-09 ENCOUNTER — TELEPHONE (OUTPATIENT)
Dept: PULMONOLOGY | Facility: CLINIC | Age: 68
End: 2023-05-09

## 2023-05-09 NOTE — TELEPHONE ENCOUNTER
Received fax from [facility] requesting cpap supply orders.  Placed on Dr. Cecil Raman folder for review

## 2023-06-12 RX ORDER — GLIMEPIRIDE 2 MG/1
2 TABLET ORAL
Qty: 90 TABLET | Refills: 3 | Status: SHIPPED | OUTPATIENT
Start: 2023-06-12

## 2023-06-12 NOTE — TELEPHONE ENCOUNTER
Refill passed per [employer] protocol.    Requested Prescriptions   Pending Prescriptions Disp Refills    GLIMEPIRIDE 2 MG Oral Tab [Pharmacy Med Name: Glimepiride Oral Tablet 2 MG] 90 tablet 0     Sig: TAKE 1 TABLET BY MOUTH EVERY DAY WITH BREAKFAST       Diabetes Medication Protocol Passed - 6/11/2023 11:16 AM        Passed - Last A1C < 7.5 and within past 6 months     Lab Results   Component Value Date    A1C 6.8 (A) 04/24/2023               Passed - In person appointment or virtual visit in the past 6 mos or appointment in next 3 mos     Recent Outpatient Visits              1 month ago Type 2 diabetes mellitus with other specified complication, unspecified whether long term insulin use ([facility] [address]    Nalini Bowden MD    Office Visit    2 months ago Obstructive sleep apnea    [facility], [address] [place] [address]    Office Visit    3 months ago Diabetes mellitus type 2 without retinopathy ([facility] [address]    [address], [address] Floor[facility] Rosa [facility] Center[facility]acho MD    Office Visit    5 months ago Type 2 diabetes mellitus without complication, without long-term current use of insulin [facility])    [address], [address] [facility]    Office Visit    5 months ago Type 2 diabetes mellitus with other specified complication, without long-term current use of insulin [facility])    Nalini Bowden MD    Office Visit          Future Appointments         Provider Department Appt Notes    Tomorrow Miguel A Fitzgerald MD [address], [address] [facility] 6 month    In 3 months MD [facility], [place] [address] [facility] 5 months               Passed - [facility] or GFRNAA > 50     GFR Evaluation  EGFRCR: 76 , resulted on 8/15/2022            Passed - GFR in the past 12 months Recent Outpatient Visits              1 month ago Type 2 diabetes mellitus with other specified complication, unspecified whether long term insulin use ([facility] [address]    Donal Beyer MD    Office Visit    2 months ago [facility]    [facility] [address] [place] [address]    Office Visit    3 months ago Diabetes mellitus type 2 without retinopathy ([facility] [address]    [facility]arshal Uriarte, [address] [facility] [address], AlligatorWendy MD    Office Visit    5 months ago Type 2 diabetes mellitus without complication, without long-term current use of insulin [facility])    Ember Guevara MD    Office Visit    5 months ago Type 2 diabetes mellitus with other specified complication, without long-term current use of insulin [facility])    Harshal Uriarte, [address] [place] [address], Cathy Gtz [facility] MD erich    Office Visit            Future Appointments         Provider Department Appt Notes    Tomorrow MD Harshal Arango, [address] [place] [address] 6 month    In 3 months [facility], [address] [place] [address] 5 months

## 2023-06-13 ENCOUNTER — OFFICE VISIT (OUTPATIENT)
Facility: CLINIC | Age: 68
End: 2023-06-13

## 2023-06-13 VITALS
WEIGHT: 214 LBS | SYSTOLIC BLOOD PRESSURE: 130 MMHG | DIASTOLIC BLOOD PRESSURE: 60 MMHG | BODY MASS INDEX: 30.64 KG/M2 | HEART RATE: 72 BPM | RESPIRATION RATE: 14 BRPM | HEIGHT: 70 IN | OXYGEN SATURATION: 97 %

## 2023-06-13 DIAGNOSIS — E78.00 PURE HYPERCHOLESTEROLEMIA: Primary | ICD-10-CM

## 2023-06-13 DIAGNOSIS — E11.9 DIABETES MELLITUS TYPE 2 WITHOUT RETINOPATHY (HCC): ICD-10-CM

## 2023-06-13 DIAGNOSIS — G47.33 OBSTRUCTIVE SLEEP APNEA: ICD-10-CM

## 2023-06-13 PROCEDURE — 3078F DIAST BP <80 MM HG: CPT | Performed by: INTERNAL MEDICINE

## 2023-06-13 PROCEDURE — 99214 OFFICE O/P EST MOD 30 MIN: CPT | Performed by: INTERNAL MEDICINE

## 2023-06-13 PROCEDURE — 3075F SYST BP GE 130 - 139MM HG: CPT | Performed by: INTERNAL MEDICINE

## 2023-06-13 PROCEDURE — 1159F MED LIST DOCD IN RCRD: CPT | Performed by: INTERNAL MEDICINE

## 2023-06-13 PROCEDURE — 1126F AMNT PAIN NOTED NONE PRSNT: CPT | Performed by: INTERNAL MEDICINE

## 2023-06-13 PROCEDURE — 3008F BODY MASS INDEX DOCD: CPT | Performed by: INTERNAL MEDICINE

## 2023-06-13 RX ORDER — FENOFIBRATE 134 MG/1
1 CAPSULE ORAL DAILY
Qty: 90 CAPSULE | Refills: 3 | Status: SHIPPED | OUTPATIENT
Start: 2023-06-13

## 2023-06-14 ENCOUNTER — LAB ENCOUNTER (OUTPATIENT)
Dept: LAB | Age: 68
End: 2023-06-14
Attending: INTERNAL MEDICINE
Payer: MEDICARE

## 2023-06-15 ENCOUNTER — LAB ENCOUNTER (OUTPATIENT)
Dept: LAB | Age: 68
End: 2023-06-15
Attending: INTERNAL MEDICINE
Payer: MEDICARE

## 2023-06-15 DIAGNOSIS — E11.69 TYPE 2 DIABETES MELLITUS WITH OTHER SPECIFIED COMPLICATION, WITHOUT LONG-TERM CURRENT USE OF INSULIN (HCC): ICD-10-CM

## 2023-06-15 DIAGNOSIS — E11.9 DIABETES MELLITUS TYPE 2 WITHOUT RETINOPATHY (HCC): ICD-10-CM

## 2023-06-15 LAB
ALBUMIN SERPL-MCNC: 3.8 G/DL (ref 3.4–5)
ALBUMIN/GLOB SERPL: 1 {RATIO} (ref 1–2)
ALP LIVER SERPL-CCNC: 31 U/L
ALT SERPL-CCNC: 44 U/L
ANION GAP SERPL CALC-SCNC: 8 MMOL/L (ref 0–18)
AST SERPL-CCNC: 30 U/L (ref 15–37)
BILIRUB SERPL-MCNC: 0.6 MG/DL (ref 0.1–2)
BUN BLD-MCNC: 13 MG/DL (ref 7–18)
BUN/CREAT SERPL: 12.1 (ref 10–20)
CALCIUM BLD-MCNC: 9.4 MG/DL (ref 8.5–10.1)
CHLORIDE SERPL-SCNC: 106 MMOL/L (ref 98–112)
CO2 SERPL-SCNC: 26 MMOL/L (ref 21–32)
CREAT BLD-MCNC: 1.07 MG/DL
EST. AVERAGE GLUCOSE BLD GHB EST-MCNC: 143 MG/DL (ref 68–126)
FASTING STATUS PATIENT QL REPORTED: YES
GFR SERPLBLD BASED ON 1.73 SQ M-ARVRAT: 75 ML/MIN/1.73M2 (ref 60–?)
GLOBULIN PLAS-MCNC: 3.7 G/DL (ref 2.8–4.4)
GLUCOSE BLD-MCNC: 177 MG/DL (ref 70–99)
HBA1C MFR BLD: 6.6 % (ref ?–5.7)
OSMOLALITY SERPL CALC.SUM OF ELEC: 294 MOSM/KG (ref 275–295)
POTASSIUM SERPL-SCNC: 4.7 MMOL/L (ref 3.5–5.1)
PROT SERPL-MCNC: 7.5 G/DL (ref 6.4–8.2)
SODIUM SERPL-SCNC: 140 MMOL/L (ref 136–145)

## 2023-06-15 PROCEDURE — 80053 COMPREHEN METABOLIC PANEL: CPT

## 2023-06-15 PROCEDURE — 83036 HEMOGLOBIN GLYCOSYLATED A1C: CPT

## 2023-06-15 PROCEDURE — 3044F HG A1C LEVEL LT 7.0%: CPT | Performed by: INTERNAL MEDICINE

## 2023-06-15 PROCEDURE — 36415 COLL VENOUS BLD VENIPUNCTURE: CPT

## 2023-08-30 DIAGNOSIS — E11.9 TYPE 2 DIABETES MELLITUS WITHOUT COMPLICATION, WITHOUT LONG-TERM CURRENT USE OF INSULIN (HCC): ICD-10-CM

## 2023-09-01 RX ORDER — GLIMEPIRIDE 2 MG/1
2 TABLET ORAL
Qty: 90 TABLET | Refills: 3 | OUTPATIENT
Start: 2023-09-01

## 2023-09-08 ENCOUNTER — TELEPHONE (OUTPATIENT)
Dept: INTERNAL MEDICINE CLINIC | Facility: CLINIC | Age: 68
End: 2023-09-08

## 2023-09-08 ENCOUNTER — MED REC SCAN ONLY (OUTPATIENT)
Dept: INTERNAL MEDICINE CLINIC | Facility: CLINIC | Age: 68
End: 2023-09-08

## 2023-09-08 DIAGNOSIS — E11.9 TYPE 2 DIABETES MELLITUS WITHOUT COMPLICATION, WITHOUT LONG-TERM CURRENT USE OF INSULIN (HCC): ICD-10-CM

## 2023-09-08 DIAGNOSIS — E78.00 PURE HYPERCHOLESTEROLEMIA: ICD-10-CM

## 2023-09-08 RX ORDER — GLIMEPIRIDE 2 MG/1
2 TABLET ORAL
Qty: 90 TABLET | Refills: 2 | Status: SHIPPED | OUTPATIENT
Start: 2023-09-08

## 2023-09-08 RX ORDER — ATORVASTATIN CALCIUM 10 MG/1
10 TABLET, FILM COATED ORAL EVERY EVENING
Qty: 90 TABLET | Refills: 0 | Status: SHIPPED | OUTPATIENT
Start: 2023-09-08

## 2023-09-08 RX ORDER — FENOFIBRATE 134 MG/1
1 CAPSULE ORAL DAILY
Qty: 90 CAPSULE | Refills: 2 | Status: SHIPPED | OUTPATIENT
Start: 2023-09-08

## 2023-09-08 NOTE — TELEPHONE ENCOUNTER
Patient is requesting to have all of his medications transferred [facility].      [facility] ([facility]) - [place], [address]

## 2023-09-18 ENCOUNTER — OFFICE VISIT (OUTPATIENT)
Dept: ENDOCRINOLOGY CLINIC | Facility: CLINIC | Age: 68
End: 2023-09-18

## 2023-09-18 VITALS
BODY MASS INDEX: 30.64 KG/M2 | WEIGHT: 214 LBS | HEART RATE: 71 BPM | SYSTOLIC BLOOD PRESSURE: 149 MMHG | DIASTOLIC BLOOD PRESSURE: 73 MMHG | HEIGHT: 70 IN

## 2023-09-18 DIAGNOSIS — E78.5 DYSLIPIDEMIA: ICD-10-CM

## 2023-09-18 DIAGNOSIS — E11.69 TYPE 2 DIABETES MELLITUS WITH OTHER SPECIFIED COMPLICATION, UNSPECIFIED WHETHER LONG TERM INSULIN USE (HCC): Primary | ICD-10-CM

## 2023-09-18 LAB
CARTRIDGE LOT#: ABNORMAL NUMERIC
GLUCOSE BLOOD: 380
HEMOGLOBIN A1C: 6.5 % (ref 4.3–5.6)
TEST STRIP LOT #: NORMAL NUMERIC

## 2023-09-18 PROCEDURE — 1160F RVW MEDS BY RX/DR IN RCRD: CPT | Performed by: INTERNAL MEDICINE

## 2023-09-18 PROCEDURE — 83036 HEMOGLOBIN GLYCOSYLATED A1C: CPT | Performed by: INTERNAL MEDICINE

## 2023-09-18 PROCEDURE — 3078F DIAST BP <80 MM HG: CPT | Performed by: INTERNAL MEDICINE

## 2023-09-18 PROCEDURE — 1159F MED LIST DOCD IN RCRD: CPT | Performed by: INTERNAL MEDICINE

## 2023-09-18 PROCEDURE — 3077F SYST BP >= 140 MM HG: CPT | Performed by: INTERNAL MEDICINE

## 2023-09-18 PROCEDURE — 82947 ASSAY GLUCOSE BLOOD QUANT: CPT | Performed by: INTERNAL MEDICINE

## 2023-09-18 PROCEDURE — 3044F HG A1C LEVEL LT 7.0%: CPT | Performed by: INTERNAL MEDICINE

## 2023-09-18 PROCEDURE — 99214 OFFICE O/P EST MOD 30 MIN: CPT | Performed by: INTERNAL MEDICINE

## 2023-09-18 PROCEDURE — 3008F BODY MASS INDEX DOCD: CPT | Performed by: INTERNAL MEDICINE

## 2023-09-18 RX ORDER — GLIMEPIRIDE 1 MG/1
1 TABLET ORAL
Qty: 90 TABLET | Refills: 0 | Status: SHIPPED | OUTPATIENT
Start: 2023-09-18

## 2023-11-12 DIAGNOSIS — E78.00 PURE HYPERCHOLESTEROLEMIA: ICD-10-CM

## 2023-11-13 RX ORDER — ATORVASTATIN CALCIUM 10 MG/1
10 TABLET, FILM COATED ORAL EVERY EVENING
Qty: 90 TABLET | Refills: 3 | Status: SHIPPED | OUTPATIENT
Start: 2023-11-13

## 2023-11-19 RX ORDER — GLIMEPIRIDE 1 MG/1
1 TABLET ORAL
Qty: 90 TABLET | Refills: 0 | Status: SHIPPED | OUTPATIENT
Start: 2023-11-19

## 2024-01-03 ENCOUNTER — OFFICE VISIT (OUTPATIENT)
Dept: ENDOCRINOLOGY CLINIC | Facility: CLINIC | Age: 69
End: 2024-01-03
Payer: COMMERCIAL

## 2024-01-03 VITALS
HEART RATE: 79 BPM | HEIGHT: 70 IN | BODY MASS INDEX: 30.64 KG/M2 | SYSTOLIC BLOOD PRESSURE: 146 MMHG | DIASTOLIC BLOOD PRESSURE: 74 MMHG | WEIGHT: 214 LBS

## 2024-01-03 DIAGNOSIS — E78.5 DYSLIPIDEMIA: ICD-10-CM

## 2024-01-03 DIAGNOSIS — E11.69 TYPE 2 DIABETES MELLITUS WITH OTHER SPECIFIED COMPLICATION, UNSPECIFIED WHETHER LONG TERM INSULIN USE (HCC): Primary | ICD-10-CM

## 2024-01-03 LAB
CARTRIDGE LOT#: ABNORMAL NUMERIC
GLUCOSE BLOOD: 180
HEMOGLOBIN A1C: 6.4 % (ref 4.3–5.6)
TEST STRIP LOT #: NORMAL NUMERIC

## 2024-01-03 PROCEDURE — 1160F RVW MEDS BY RX/DR IN RCRD: CPT | Performed by: INTERNAL MEDICINE

## 2024-01-03 PROCEDURE — 83036 HEMOGLOBIN GLYCOSYLATED A1C: CPT | Performed by: INTERNAL MEDICINE

## 2024-01-03 PROCEDURE — 3044F HG A1C LEVEL LT 7.0%: CPT | Performed by: INTERNAL MEDICINE

## 2024-01-03 PROCEDURE — 82947 ASSAY GLUCOSE BLOOD QUANT: CPT | Performed by: INTERNAL MEDICINE

## 2024-01-03 PROCEDURE — 1159F MED LIST DOCD IN RCRD: CPT | Performed by: INTERNAL MEDICINE

## 2024-01-03 PROCEDURE — 3008F BODY MASS INDEX DOCD: CPT | Performed by: INTERNAL MEDICINE

## 2024-01-03 PROCEDURE — 3077F SYST BP >= 140 MM HG: CPT | Performed by: INTERNAL MEDICINE

## 2024-01-03 PROCEDURE — 3078F DIAST BP <80 MM HG: CPT | Performed by: INTERNAL MEDICINE

## 2024-01-03 PROCEDURE — 99213 OFFICE O/P EST LOW 20 MIN: CPT | Performed by: INTERNAL MEDICINE

## 2024-01-03 NOTE — PROGRESS NOTES
Return Office Visit     CHIEF COMPLAINT:    DM   Dyslipidemia    HISTORY OF PRESENT ILLNESS:  Nacho Will is a 68 year old male who presents for follow up for DM.      DM HISTORY  Diagnosed: around age  59     HISTORY OF DIABETES COMPLICATIONS: :  History of Retinopathy:denies, last eye exam:  2023  History of Neuropathy: yes. Dr Green  History of Nephropathy: no      ASSOCIATED COMPLICATIONS:   HTN: denies  Hyperlipidemia: yes  Coronary Artery Disease:  denies  Cerebrovascular Disease: denies        HOME GLUCOSE READINGS:   Checks a few times a week, alternates timings  Fastin-160  Bedtime: average around 160    No low BG under 70          CURRENT DIABETIC MEDICATIONS INCLUDE:  MTF  1000 Two tabs BID  Amaryl 2 mg BF and 1 mg dinner     MEALS:  3-4 meals a day  Dietary compliance with a low carb diet has been moderate    EXERCISE:  No regular exercise, but is very active         CURRENT MEDICATION:    Current Outpatient Medications   Medication Sig Dispense Refill    glimepiride 1 MG Oral Tab Take 1 tablet (1 mg total) by mouth daily with dinner. 90 tablet 0    atorvastatin 10 MG Oral Tab Take 1 tablet (10 mg total) by mouth every evening. 90 tablet 3    Fenofibrate 134 MG Oral Cap Take 1 capsule by mouth daily. 90 capsule 2    glimepiride 2 MG Oral Tab Take 1 tablet (2 mg total) by mouth daily with breakfast. 90 tablet 2    metFORMIN 500 MG Oral Tab Take 2 tablets (1,000 mg total) by mouth 2 (two) times daily with meals. 360 tablet 2    vitamin E 600 UNITS Oral Cap Take 1 capsule (600 Units total) by mouth daily.      Multiple Vitamins-Minerals (MULTI-VITAMIN/MINERALS) Oral Tab Take 1 tablet by mouth daily.      [employer] LANCETS ULTRATHIN 30G Does not apply Misc Test by Intradermal route 2 times every day 100 each 11    aspirin 81 MG Oral Chew Tab Chew 1 tablet (81 mg total) by mouth daily.      Glucosamine-Chondroitin 750-600 MG Oral Tab Take 1 tablet by mouth daily.           ALLERGY:  Allergies    Allergen Reactions    Penicillins      Other reaction(s): Hives       PAST MEDICAL, SOCIAL AND FAMILY HISTORY:  See past medical history marked as reviewed.  See past surgical history marked as reviewed.  See past family history marked as reviewed.  See past social history marked as reviewed.    ASSESSMENTS:     REVIEW OF SYSTEMS:  Constitutional: Negative for: weight change, fever, fatigue, cold/heat intolerance  Eyes: Negative for:  Visual changes, proptosis, blurring  ENT: Negative for:  dysphagia, neck swelling, dysphonia  Respiratory: Negative for:  dyspnea, cough  Cardiovascular: Negative for:  chest pain, palpitations, orthopnea  GI: Negative for:  abdominal pain, nausea, vomiting, diarrhea, constipation, bleeding  Neurology: Negative for: headache, numbness, weakness  Genito-Urinary: Negative for: dysuria, frequency  Psychiatric: Negative for:  depression, anxiety  Hematology/Lymphatics: Negative for: bruising, lower extremity edema  Endocrine: Negative for: polyuria, polydypsia  Skin: Negative for: rash, blister, cellulitis,       PHYSICAL EXAM:   Vitals:    01/03/24 0956   BP: 148/71   Pulse: 78   Weight: 214 lb (97.1 kg)   Height: 5' 10\" (1.778 m)     BMI: Body mass index is 30.71 kg/m².         General Appearance:  alert, well developed, in no acute distress  Head: Atraumatic  Eyes:  normal conjunctivae, sclera., normal sclera and normal pupils  Throat/Neck: normal sound to voice. Normal hearing, normal speech  Respiratory:  Speaking in full sentences, non-labored. no increased work of breathing, no audible wheezing    Neuro: motor grossly intact, moving all extremities without difficulty  Psychiatric:  oriented to time, self, and place  Extremities:   Bilateral barefoot skin diabetic exam is normal, visualized feet and the appearance is normal.  Bilateral monofilament/sensation of both feet is normal.  Pulsation pedal pulse exam of both lower legs/feet is normal as well.          DATA:          Pertinent data reviewed  a1c 6.4 % ( 1/2024)     ASSESSMENT AND PLAN:     1. Type 2 DM:     Plan:  Discussed the pathogenesis, natural course of diabetes. Patient understands the importance of glycemic control and the implications of uncontrolled diabetes including Diabetic ketoacidosis and various micro vascular and macrovascular complications.           a). Medications:        Metformin 1000 mg BID  Take with food  GI SE        Amaryl  2 mg BF and 1 mg with dinner  Counselled regarding risk of hypoglycemia associated with use.      I reviewed GLP agonists and SGLT 2 inhibitors, however these are tier 3 and he states he has enquired about the cost and they are extensive     b). No h/o Nephropathy :  Ur MA were slightly high, normal on last check. Continued good BG and BP control  c). Discussed importance of annual eye exams  d). Foot exam: Daily feet exam explained  e). BG log maintainence explained in great detail, to get log and glucometer on next visit.  f). Life style changes reviewed  g). Hypoglycemia recognition and management discussed     2. Patient’s BP is slightly high today  Low salt diet, monitor BP at home  States he was started on lisinopril but is not on it any more  Reports he had cardiac JONES which was okay  He states he amador snot want to be on medication   Understands implications of high BP   Will continue to address  3. Dyslipidemia  A) Discussed lifestyle modifications including reductions in dietary total and saturated fat, weight loss, aerobic exercise, and eating a diet rich in fruits and vegetables.  B) Statin therapy  Discussed the potential side effects of statins including muscle and liver injury.  TG high: low fat diet, c/w fenofibrate no SE  Fasting lipid panel reviewed     RTC in 5 months  Bib with BG as discussed  Fasting labs : ordered , reminded to get labs       Orders Placed This Encounter   Procedures    POC HemoCue Glucose 201 (Finger stick glucose)    POC Glycohemoglobin  [06532]           Kiersten Cortez MD

## 2024-01-05 RX ORDER — GLIMEPIRIDE 2 MG/1
2 TABLET ORAL
Qty: 90 TABLET | Refills: 3 | Status: SHIPPED | OUTPATIENT
Start: 2024-01-05

## 2024-01-05 NOTE — TELEPHONE ENCOUNTER
Refill passed per [facility] protocol.  Requested Prescriptions   Pending Prescriptions Disp Refills    glimepiride 2 MG Oral Tab 90 tablet 2     Sig: Take 1 tablet (2 mg total) by mouth daily with breakfast.       Diabetes Medication Protocol Passed - 1/4/2024  1:50 PM        Passed - Last A1C < 7.5 and within past 6 months     Lab Results   Component Value Date    A1C 6.4 (A) 01/03/2024             Passed - In person appointment or virtual visit in the past 6 mos or appointment in next 3 mos     Recent Outpatient Visits              2 days ago Type 2 diabetes mellitus with other specified complication, unspecified whether long term insulin use ([facility])    [facility], [facility] Kiersten Redding MD    Office Visit    3 months ago Type 2 diabetes mellitus with other specified complication, unspecified whether long term insulin use ([facility])    [facility], [facility], Kiersten Redding MD    Office Visit    6 months ago Pure hypercholesterolemia    [facility] Brandon Lewis MD    Office Visit    8 months ago Type 2 diabetes mellitus with other specified complication, unspecified whether long term insulin use ([facility])    [facility], Kiersten Redding MD    Office Visit    8 months ago Obstructive sleep apnea    [facility], [facility], [place]    Office Visit          Future Appointments         Provider Department Appt Notes    In 1 week Brandon Lewis MD [facility], [facility] Return in about 7 months (around 1/13/2024) for physical.               Passed - EGFRCR or GFRNAA > 50     GFR Evaluation  EGFRCR: 75 , resulted on 6/15/2023          Passed - GFR in the past 12 months           Recent Outpatient Visits              2 days ago Type 2 diabetes mellitus with other specified  complication, unspecified whether long term insulin use ([facility])    [facility], [facility], Kiersten Redding MD    Office Visit    3 months ago Type 2 diabetes mellitus with other specified complication, unspecified whether long term insulin use ([facility])    [facility], [facility], Kiersten Redding MD    Office Visit    6 months ago Pure hypercholesterolemia    [facility], [facility], Brandon Barreto MD    Office Visit    8 months ago Type 2 diabetes mellitus with other specified complication, unspecified whether long term insulin use ([facility])    [facility], [facility], Kiersten Redding MD    Office Visit    8 months ago Obstructive sleep apnea    [facility]    Office Visit          Future Appointments         Provider Department Appt Notes    In 1 week [facility] Lewis [facility] in about 7 months (around 1/13/2024) for physical.

## 2024-01-16 ENCOUNTER — OFFICE VISIT (OUTPATIENT)
Facility: CLINIC | Age: 69
End: 2024-01-16
Payer: COMMERCIAL

## 2024-01-16 VITALS
DIASTOLIC BLOOD PRESSURE: 70 MMHG | OXYGEN SATURATION: 98 % | BODY MASS INDEX: 30.75 KG/M2 | HEART RATE: 68 BPM | WEIGHT: 214.81 LBS | SYSTOLIC BLOOD PRESSURE: 138 MMHG | HEIGHT: 70 IN

## 2024-01-16 DIAGNOSIS — Z12.5 SCREENING PSA (PROSTATE SPECIFIC ANTIGEN): ICD-10-CM

## 2024-01-16 DIAGNOSIS — R93.1 ABNORMAL HEART SCORE CT: ICD-10-CM

## 2024-01-16 DIAGNOSIS — H25.13 AGE-RELATED NUCLEAR CATARACT OF BOTH EYES: ICD-10-CM

## 2024-01-16 DIAGNOSIS — E55.9 VITAMIN D DEFICIENCY: ICD-10-CM

## 2024-01-16 DIAGNOSIS — G47.33 OBSTRUCTIVE SLEEP APNEA: ICD-10-CM

## 2024-01-16 DIAGNOSIS — N52.9 ERECTILE DYSFUNCTION, UNSPECIFIED ERECTILE DYSFUNCTION TYPE: ICD-10-CM

## 2024-01-16 DIAGNOSIS — E11.9 DIABETES MELLITUS TYPE 2 WITHOUT RETINOPATHY (HCC): Primary | ICD-10-CM

## 2024-01-16 DIAGNOSIS — I10 HYPERTENSION, UNSPECIFIED TYPE: ICD-10-CM

## 2024-01-16 DIAGNOSIS — G06.2 EPIDURAL ABSCESS: ICD-10-CM

## 2024-01-16 DIAGNOSIS — E78.00 PURE HYPERCHOLESTEROLEMIA: ICD-10-CM

## 2024-01-16 DIAGNOSIS — M23.90 INTERNAL DERANGEMENT OF KNEE, UNSPECIFIED LATERALITY: ICD-10-CM

## 2024-01-16 PROCEDURE — 99213 OFFICE O/P EST LOW 20 MIN: CPT | Performed by: INTERNAL MEDICINE

## 2024-01-16 PROCEDURE — 3008F BODY MASS INDEX DOCD: CPT | Performed by: INTERNAL MEDICINE

## 2024-01-16 PROCEDURE — 1170F FXNL STATUS ASSESSED: CPT | Performed by: INTERNAL MEDICINE

## 2024-01-16 PROCEDURE — 3078F DIAST BP <80 MM HG: CPT | Performed by: INTERNAL MEDICINE

## 2024-01-16 PROCEDURE — 96160 PT-FOCUSED HLTH RISK ASSMT: CPT | Performed by: INTERNAL MEDICINE

## 2024-01-16 PROCEDURE — 3075F SYST BP GE 130 - 139MM HG: CPT | Performed by: INTERNAL MEDICINE

## 2024-01-16 PROCEDURE — G0439 PPPS, SUBSEQ VISIT: HCPCS | Performed by: INTERNAL MEDICINE

## 2024-01-16 PROCEDURE — 1159F MED LIST DOCD IN RCRD: CPT | Performed by: INTERNAL MEDICINE

## 2024-01-16 PROCEDURE — 1125F AMNT PAIN NOTED PAIN PRSNT: CPT | Performed by: INTERNAL MEDICINE

## 2024-01-16 NOTE — PATIENT INSTRUCTIONS
Controlling High Blood Pressure   High blood pressure (hypertension) is often called the silent killer. This is because many people who have it, don’t know it. It can be very dangerous. High blood pressure can raise your risk of heart attack, stroke, heart disease, and heart failure. Controlling your blood pressure can lower your risk of these problems. It's important to check yourblood pressure regularly. It can save your life.   Blood pressure measurements are given as 2 numbers. Systolic blood pressure is the upper number. This is the pressure when the heart contracts. Diastolic blood pressure is the lower number. This is the pressure when the heartrelaxes between beats.   Blood pressure is grouped like this:   Normal blood pressure. This is systolic of less than 120 and diastolic of less than 80 (120/80).  Elevated blood pressure.  This is systolic of 120 to 129 and diastolic less than 80.  Stage 1 high blood pressure.  This is systolic of 130 to 139 or diastolic between 80 to 89.  Stage 2 high blood pressure.  This is systolic of 140 or higher or diastolic of 90 or higher.  A heart-healthy lifestyle can help you control your blood pressure withoutmedicines. Below are some things you can do to have a heart-healthy lifestyle.     Eat heart-healthy foods   Choose low-salt, low-fat foods. Limit your sodium to 2,300 mg per day or the amount advised by your healthcare provider.  Limit canned, dried, cured, packaged, and fast foods. These can contain a lot of salt.  Eat 8 to 10 servings of fruits and vegetables every day.  Choose lean meats, fish, or chicken.  Eat whole-grain pasta, brown rice, and beans.  Eat 2 to 3 servings of low-fat or fat-free dairy products.  Ask your doctor about the DASH eating plan. This plan helps reduce blood pressure.  When you go to a restaurant, ask that your meal be made with no added salt.    Stay at a healthy weight   Ask your healthcare provider how many calories to eat a day. Then  stick to that number.  Ask your provider what weight range is healthiest for you. If you are overweight, a weight loss of only 3% to 5% of your body weight can help lower blood pressure. A good weight loss goal is to lose 10% of your body weight in a year.  Limit snacks and sweets.  Get regular exercise.    Get more active   Find activities you enjoy. They can be done alone or with friends or family. Try bicycling, dancing, walking, or jogging.  Park farther away from building entrances to walk more.  Use stairs instead of the elevator.  When you can, walk or bike instead of driving.  Festus leaves, garden, or do household repairs.  Be active at a moderate to vigorous level of physical activity for at least 30 minutes a day for at least 5 days a week.     Manage stress   Make time to relax and enjoy life. Find time to laugh.  Talk about your concerns with your loved ones and your healthcare provider.  Visit with family and friends, and keep up with hobbies.    Limit alcohol and quit smoking   Men should have no more than 2 drinks per day.  Women should have no more than 1 drink per day.  If you smoke, make a plan to stop. Talk with your healthcare provider for help. Smoking greatly raises your risk for heart disease and stroke. Ask your provider about stop-smoking programs and other support.    Blood pressure medicines  If your lifestyle changes aren’t enough, your healthcare provider may prescribe high blood pressure medicine. Take all medicines as prescribed. If you have any questions about yourmedicines, ask your provider before stopping or changing them.   [employer] last reviewed this educational content on12/1/2021 © 2000-2022 [employer]. All rights reserved. This information is not intended as a substitute for professional medical care. Always follow yourLicking Memorial Hospitalcare professional's instruction    [employer]™  What is High Blood Pressure?  Understand what blood pressure is, the health risks  of having high blood pressure, the factors that put you at risk for having high blood pressure, and the importance of working with your healthcare provider to control it.  To watch the video:  Scan the QR code  Using your mobile device, scan the following code:  OR  Go to the website:  [employer]  Enter the prescription code:  3414E    © 2000-2022 [employer]. All rights reserved. This information is not intended as a substitute for professional medical care. Always follow your healthcare professional's instructions.    [employer]  Eating Well with High Blood Pressure  Certain foods can make your blood pressure go too high. Watch and learn how easy it is to have delicious meals without harming your health.     To watch the video:  Scan the QR code  Using your mobile device, scan the following code:  OR  Go to the website:  www.kramesvideo.com  Enter the prescription code:   QIX       © 2000-2022 [employer]. All rights reserved. This information is not intended as a substitute for professional medical care. Always follow your healthcare professional's instructions.    Taking Your Blood Pressure  Blood pressure is the force of blood against the artery wall as it moves from the heart through the blood vessels. You can take your own blood pressure reading using a digital monitor. Take your readings the same each time, usingthe same arm. Take readings as often as your healthcare provider advises.   About blood pressure monitors  Blood pressure monitors are designed for certain ages and cases. You can find monitors for older adults, for pregnant women, and for children. Make sure theone you choose is the right one for your age and situation.   Experts advise an automatic cuff monitor that fits on your upper arm (bicep). The cuff should fit your arm size. A cuff that’s too large or too small won't give an accurate reading. Measure around yourupper arm to find your  size.   Monitors that attach to your finger or wrist are not as accurate as monitorsfor your upper arm.   Ask your healthcare provider for help in choosing a monitor. Bring your monitorto your next provider visit if you need help in using it the correct way.   The steps below are general instructions for using an automatic digitalmonitor.   Step 1. Relax    Take your blood pressure at the same time every day, such as in the morning or evening. Or take it at the time your healthcare provider advises.  Wait at least 30 minutes after smoking, eating, or exercising. Don't drink coffee, tea, soda, or other caffeinated drinks before checking your blood pressure. Use the restroom beforehand.  Sit comfortably at a table with both feet on the floor. Don't cross your legs or feet. Place the monitor near you.  Rest for at least 5 minutes before you begin. Make sure there are no distractions. This includes TV, cell phones, and other electronics. Wait to have conversations with others until after you measure you blood pressure.  Step 2. Wrap the cuff    Place your arm on the table, palm up. Your arm should be at the level of your heart. Wrap the cuff around your upper arm, just above your elbow. It’s best done on bare skin, not over clothing. Most cuffs will show you where the blood vessel in the middle of the arm at the inner side of the elbow (the brachial artery) should line up with the cuff. Look in your monitor's instruction booklet for an illustration. You can also bring your cuff to your healthcare provider and have them show you how to correctly place the cuff.  Step 3. Inflate the cuff    Push the button that starts the pump.  The cuff will tighten, then loosen.  The numbers will change. When they stop changing, your blood pressure reading will appear.  Take 2 or 3 readings 1 minute apart, or as advised by your provider.  Step 4. Write down the results of each reading    Write down your blood pressure numbers for each  reading. Note the date and time. Keep your results in 1 place, such as a notebook. Even if your monitor has a built-in memory, keep a hard copy of the readings.  Remove the cuff from your arm. Turn off the machine.  Bring your blood pressure records with you to each provider visit.  If you start a new blood pressure medicine, note the day you started the new medicine. Also note the day if you change the dose of your medicine. Measure your blood pressure before your take your medicine. This information goes on your blood pressure recording sheet. This will help your provider check how well the medicine changes are working.  Ask your provider what numbers mean that you should call them. Also ask what numbers mean that you should get help right away.  [employer] last reviewed this educational content on12/1/2021 © 2000-2022 [employer]. All rights reserved. This information is not intended as a substitute for professional medical care. Always follow yourhealthcare professional's instructions.

## 2024-01-16 NOTE — PROGRESS NOTES
Subjective:   Nacho Will is a 68 year old male who presents for a MA (Medicare Advantage) Supervisit (Once per calendar year) and scheduled follow up of multiple significant but stable problems and acute exacerbation of a chronic illness.   68-year-old gentleman here for Medicare advantage super visit.  He reports that he is doing well.  He has some stress because of financial situation.  No abuse reported.  He does feel slightly down when he thinks about financial issues.  No suicidal homicidal ideations.  His blood pressure was slightly elevated in the office.    History/Other:   Fall Risk Assessment:   He has been screened for Falls and is low risk.      Cognitive Assessment:   He had a completely normal cognitive assessment - see flowsheet entries     Functional Ability/Status:   Nacho Will has some abnormal functions as listed below:  He has Hearing problems based on screening of functional status.      Depression Screening (PHQ-2/PHQ-9): PHQ-2 SCORE: 0, done 1/16/2024        <5 minutes spent screening and counseling for depression    Advanced Directives:   He does NOT have a Living Will. [ ]  He does NOT have a Power of  for Health Care. [ ]  Discussed Advance Care Planning with patient (and family/surrogate if present). Standard forms made available to patient in After Visit Summary.      Patient Active Problem List   Diagnosis    Internal derangement of knee    Diabetes mellitus type 2 without retinopathy (HCC)    Vitamin D deficiency    Abnormal Heart Score CT    Pure hypercholesterolemia    ED (erectile dysfunction)    Epidural abscess    Age-related nuclear cataract of both eyes    Obstructive sleep apnea     Allergies:  He is allergic to penicillins.    Current Medications:  Outpatient Medications Marked as Taking for the 1/16/24 encounter (Office Visit) with Brandon Lewis MD   Medication Sig    glimepiride 2 MG Oral Tab Take 1 tablet (2 mg total) by mouth daily with breakfast.     glimepiride 1 MG Oral Tab Take 1 tablet (1 mg total) by mouth daily with dinner.    atorvastatin 10 MG Oral Tab Take 1 tablet (10 mg total) by mouth every evening.    Fenofibrate 134 MG Oral Cap Take 1 capsule by mouth daily.    metFORMIN 500 MG Oral Tab Take 2 tablets (1,000 mg total) by mouth 2 (two) times daily with meals.    vitamin E 600 UNITS Oral Cap Take 1 capsule (600 Units total) by mouth daily.    Multiple Vitamins-Minerals (MULTI-VITAMIN/MINERALS) Oral Tab Take 1 tablet by mouth daily.    [employer] LANCETS ULTRATHIN 30G Does not apply Misc Test by Intradermal route 2 times every day    aspirin 81 MG Oral Chew Tab Chew 1 tablet (81 mg total) by mouth daily.    Glucosamine-Chondroitin 750-600 MG Oral Tab Take 1 tablet by mouth daily.       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  Surgical History:  He  has a past surgical history that includes electrocardiogram, complete; back surgery; and colonoscopy.   Family History:  His family history includes Cancer in his sister; Diabetes in his sister; ETOH homeless in his brother; Heart Disease in his father; Heart Disorder in his father; Heart Surgery in his father; Lung Disorder in his mother; Psychiatric in his sister; recovering ETOH in his brother.  Social History:  He  reports that he has never smoked. He has never used smokeless tobacco. He reports current alcohol use. He reports that he does not use drugs.    Tobacco:  He has never smoked tobacco.    CAGE Alcohol Screen:   CAGE screening score of 0 on 1/9/2024, showing low risk of alcohol abuse.      Patient Care Team:  Brandon Lewis MD as PCP - General (Internal Medicine)    Review of Systems   Constitutional:  Negative for activity change, appetite change and fever.   HENT:  Negative for congestion and voice change.    Respiratory:  Negative for cough and shortness of breath.     Cardiovascular:  Negative for chest pain.   Gastrointestinal:  Negative for abdominal distention, abdominal pain and vomiting.   Genitourinary:  Negative for hematuria.   Skin:  Negative for wound.   Psychiatric/Behavioral:  Negative for behavioral problems.          Objective:   Physical Exam  Constitutional:       Appearance: Normal appearance.   HENT:      Head: Normocephalic.      Right Ear: Tympanic membrane normal.      Left Ear: Tympanic membrane normal.      Nose: Nose normal.      Mouth/Throat:      Mouth: Mucous membranes are moist.      Pharynx: Oropharynx is clear.   Eyes:      General: No scleral icterus.     Conjunctiva/sclera: Conjunctivae normal.   Neck:      Vascular: No carotid bruit.   Cardiovascular:      Rate and Rhythm: Normal rate and regular rhythm.      Pulses: Normal pulses.      Heart sounds: Normal heart sounds. No murmur heard.  Pulmonary:      Effort: Pulmonary effort is normal. No respiratory distress.      Breath sounds: Normal breath sounds. No wheezing, rhonchi or rales.   Abdominal:      General: Bowel sounds are normal.      Palpations: Abdomen is soft.      Tenderness: There is no abdominal tenderness. There is no guarding or rebound.   Musculoskeletal:      Cervical back: Neck supple. No rigidity or tenderness.      Right lower leg: No edema.      Left lower leg: No edema.   Lymphadenopathy:      Cervical: No cervical adenopathy.   Skin:     General: Skin is warm and dry.   Neurological:      General: No focal deficit present.      Mental Status: He is alert and oriented to person, place, and time. Mental status is at baseline.      Cranial Nerves: No cranial nerve deficit.      Coordination: Coordination normal.   Psychiatric:         Mood and Affect: Mood normal.         Behavior: Behavior normal.         Thought Content: Thought content normal.         Judgment: Judgment normal.     Ventral hernia present      Bilateral barefoot skin diabetic exam is normal, visualized feet  and the appearance is normal.  Bilateral monofilament/sensation of both feet is normal.  Pulsation pedal pulse exam of both lower legs/feet is normal as well.     /70   Pulse 68   Ht 5' 10\" (1.778 m)   Wt 214 lb 12.8 oz (97.4 kg)   SpO2 98%   BMI 30.82 kg/m²  Estimated body mass index is 30.82 kg/m² as calculated from the following:    Height as of this encounter: 5' 10\" (1.778 m).    Weight as of this encounter: 214 lb 12.8 oz (97.4 kg).    Medicare Hearing Assessment:   Hearing Screening    Screening Method: Questionnaire  I have a problem hearing over the telephone: No I have trouble following the conversations when two or more people are talking at the same time: No   I have trouble understanding things on the TV: No I have to strain to understand conversations: No   I have to worry about missing the telephone ring or doorbell: No I have trouble hearing conversations in a noisy background such as a crowded room or restaurant: No   I get confused about where sounds come from: No I misunderstand some words in a sentence and need to ask people to repeat themselves: No   I especially have trouble understanding the speech of women and children: No I have trouble understanding the speaker in a large room such as at a meeting or place of Religious: No   Many people I talk to seem to mumble (or don't speak clearly): No People get annoyed because I misunderstand what they say: No   I misunderstand what others are saying and make inappropriate responses: No I avoid social activities because I cannot hear well and fear I will reply improperly: No   Family members and friends have told me they think I may have hearing loss: No                   Assessment & Plan:   Nacho Will is a 68 year old male who presents for a Medicare Assessment.     1. Diabetes mellitus type 2 without retinopathy (HCC) (Primary)  Foot exam completed today.  He will follow-up with ophthalmology.  Check A1c.  -     CBC, Platelet; No  Differential; Future; Expected date: 01/16/2024  -     Comp Metabolic Panel (14); Future; Expected date: 01/16/2024  -     Lipid Panel; Future; Expected date: 01/16/2024  -     TSH W Reflex To Free T4; Future; Expected date: 01/16/2024  -     Microalb/Creat Ratio, Random Urine; Future; Expected date: 01/16/2024  -     Ophthalmology Referral - External  2. Pure hypercholesterolemia monitor lipid profile LFT  3. Abnormal Heart Score CT  Overview:  Stress test 2019 nL  4. Vitamin D deficiency stable  5. Internal derangement of knee, unspecified laterality  Overview:  Log Date: 04/09/2012      6. Erectile dysfunction, unspecified erectile dysfunction type stable   7. Obstructive sleep apnea  Overview:  On cpap  He has been using his CPAP regularly and it has been helping him.  8. Age-related nuclear cataract of both eyes follows with ophthalmology  9. Epidural abscess  Overview:  In 2007   S/p ABX and surgery  10. Screening PSA (prostate specific antigen)  -     PSA Total, Screen; Future; Expected date: 01/16/2024    Additional evaluation apart from medical advantage super visit    11 hypertension-initially his blood pressure is elevated.  Because of diabetes, I have discussed with him regarding ACE/ARB.  He did not tolerate ACE inhibitors in the past.  He wants to hold off ARB for now.  He will monitor blood pressure at home.  If it is high, he will contact me.  Discussed low-salt diet.    The patient indicates understanding of these issues and agrees to the plan.  Reinforced healthy diet, lifestyle, and exercise.      No follow-ups on file.     Brandon Lewis MD, 1/16/2024     Supplementary Documentation:   General Health:           Nacho Will's SCREENING SCHEDULE   Tests on this list are recommended by your physician but may not be covered, or covered at this frequency, by your insurer.   Please check with your insurance carrier before scheduling to verify coverage.   PREVENTATIVE SERVICES FREQUENCY &  COVERAGE  DETAILS LAST COMPLETION DATE   Diabetes Screening    Fasting Blood Sugar / Glucose    One screening every 12 months if never tested or if previously tested but not diagnosed with pre-diabetes   One screening every 6 months if diagnosed with pre-diabetes Lab Results   Component Value Date     (H) 06/15/2023        Cardiovascular Disease Screening    Lipid Panel  Cholesterol  Lipoprotein (HDL)  Triglycerides Covered every 5 years for all Medicare beneficiaries without apparent signs or symptoms of cardiovascular disease Lab Results   Component Value Date    CHOLEST 106 01/13/2023    HDL 31 (L) 01/13/2023    LDL 35 01/13/2023    LDLD 64 05/03/2019    TRIG 258 (H) 01/13/2023         Electrocardiogram (EKG)   Covered if needed at Welcome to Medicare, and non-screening if indicated for medical reasons 01/12/2023      Ultrasound Screening for Abdominal Aortic Aneurysm (AAA) Covered once in a lifetime for one of the following risk factors    Men who are 65-75 years old and have ever smoked    Anyone with a family history 05/15/2019     Colorectal Cancer Screening  Covered for ages 50-85; only need ONE of the following:    Colonoscopy   Covered every 10 years    Covered every 2 years if patient is at high risk or previous colonoscopy was abnormal 11/20/2015    Health Maintenance   Topic Date Due    Colorectal Cancer Screening  11/20/2025       Flexible Sigmoidoscopy   Covered every 4 years -    Fecal Occult Blood Test Covered annually -   Prostate Cancer Screening    Prostate-Specific Antigen (PSA) Annually No results found for: \"PSA\"  Health Maintenance   Topic Date Due    PSA  01/13/2025      Immunizations    Influenza Covered once per flu season  Please get every year -  No recommendations at this time    Pneumococcal Each vaccine ([id number] & [id number]) covered once after 65 Prevnar 13: 05/12/2020    [id number]: 02/08/2022     No recommendations at this time    Hepatitis B One screening covered for patients  with certain risk factors   -  No recommendations at this time    Tetanus Toxoid Not covered by Medicare Part B unless medically necessary (cut with metal); may be covered with your pharmacy prescription benefits -    Tetanus, Diptheria and Pertusis TD and TDaP Not covered by Medicare Part B -  No recommendations at this time    Zoster Not covered by Medicare Part B; may be covered with your pharmacy  prescription benefits -  Zoster Vaccines(1 of 2) Never done     Diabetes      Hemoglobin A1C Annually; if last result is elevated, may be asked to retest more frequently.    Medicare covers every 3 months Lab Results   Component Value Date     (H) 06/15/2023    A1C 6.4 (A) 01/03/2024       No recommendations at this time    Creat/alb ratio Annually Lab Results   Component Value Date    MICROALBCREA 17.9 01/13/2023       LDL Annually Lab Results   Component Value Date    LDL 35 01/13/2023       Dilated Eye Exam Annually Last Diabetic Eye Exam:  Last Dilated Eye Exam: 02/15/23  No data recorded

## 2024-01-22 ENCOUNTER — LAB ENCOUNTER (OUTPATIENT)
Dept: LAB | Age: 69
End: 2024-01-22
Attending: INTERNAL MEDICINE
Payer: MEDICARE

## 2024-01-22 DIAGNOSIS — Z12.5 SCREENING PSA (PROSTATE SPECIFIC ANTIGEN): ICD-10-CM

## 2024-01-22 DIAGNOSIS — E11.9 DIABETES MELLITUS TYPE 2 WITHOUT RETINOPATHY (HCC): ICD-10-CM

## 2024-01-22 LAB
ALBUMIN SERPL-MCNC: 4.5 G/DL (ref 3.2–4.8)
ALBUMIN/GLOB SERPL: 1.5 {RATIO} (ref 1–2)
ALP LIVER SERPL-CCNC: 30 U/L
ALT SERPL-CCNC: 30 U/L
ANION GAP SERPL CALC-SCNC: 6 MMOL/L (ref 0–18)
AST SERPL-CCNC: 23 U/L (ref ?–34)
BILIRUB SERPL-MCNC: 0.6 MG/DL (ref 0.2–1.1)
BUN BLD-MCNC: 12 MG/DL (ref 9–23)
BUN/CREAT SERPL: 10.4 (ref 10–20)
CALCIUM BLD-MCNC: 9.9 MG/DL (ref 8.7–10.4)
CHLORIDE SERPL-SCNC: 107 MMOL/L (ref 98–112)
CHOLEST SERPL-MCNC: 105 MG/DL (ref ?–200)
CO2 SERPL-SCNC: 28 MMOL/L (ref 21–32)
COMPLEXED PSA SERPL-MCNC: 0.8 NG/ML (ref ?–4)
CREAT BLD-MCNC: 1.15 MG/DL
CREAT UR-SCNC: 72.5 MG/DL
DEPRECATED RDW RBC AUTO: 38.4 FL (ref 35.1–46.3)
EGFRCR SERPLBLD CKD-EPI 2021: 69 ML/MIN/1.73M2 (ref 60–?)
ERYTHROCYTE [DISTWIDTH] IN BLOOD BY AUTOMATED COUNT: 12.8 % (ref 11–15)
FASTING PATIENT LIPID ANSWER: YES
FASTING STATUS PATIENT QL REPORTED: YES
GLOBULIN PLAS-MCNC: 3 G/DL (ref 2.8–4.4)
GLUCOSE BLD-MCNC: 169 MG/DL (ref 70–99)
HCT VFR BLD AUTO: 40.5 %
HDLC SERPL-MCNC: 34 MG/DL (ref 40–59)
HGB BLD-MCNC: 14 G/DL
LDLC SERPL CALC-MCNC: 37 MG/DL (ref ?–100)
MCH RBC QN AUTO: 28.5 PG (ref 26–34)
MCHC RBC AUTO-ENTMCNC: 34.6 G/DL (ref 31–37)
MCV RBC AUTO: 82.5 FL
MICROALBUMIN UR-MCNC: 1.6 MG/DL
MICROALBUMIN/CREAT 24H UR-RTO: 22.1 UG/MG (ref ?–30)
NONHDLC SERPL-MCNC: 71 MG/DL (ref ?–130)
OSMOLALITY SERPL CALC.SUM OF ELEC: 296 MOSM/KG (ref 275–295)
PLATELET # BLD AUTO: 191 10(3)UL (ref 150–450)
POTASSIUM SERPL-SCNC: 4.7 MMOL/L (ref 3.5–5.1)
PROT SERPL-MCNC: 7.5 G/DL (ref 5.7–8.2)
RBC # BLD AUTO: 4.91 X10(6)UL
SODIUM SERPL-SCNC: 141 MMOL/L (ref 136–145)
T4 FREE SERPL-MCNC: 1.3 NG/DL (ref 0.8–1.7)
TRIGL SERPL-MCNC: 214 MG/DL (ref 30–149)
TSI SER-ACNC: 4.92 MIU/ML (ref 0.55–4.78)
VLDLC SERPL CALC-MCNC: 29 MG/DL (ref 0–30)
WBC # BLD AUTO: 4.4 X10(3) UL (ref 4–11)

## 2024-01-22 PROCEDURE — 84439 ASSAY OF FREE THYROXINE: CPT

## 2024-01-22 PROCEDURE — 82043 UR ALBUMIN QUANTITATIVE: CPT

## 2024-01-22 PROCEDURE — 80053 COMPREHEN METABOLIC PANEL: CPT

## 2024-01-22 PROCEDURE — 82570 ASSAY OF URINE CREATININE: CPT

## 2024-01-22 PROCEDURE — 85027 COMPLETE CBC AUTOMATED: CPT

## 2024-01-22 PROCEDURE — 36415 COLL VENOUS BLD VENIPUNCTURE: CPT

## 2024-01-22 PROCEDURE — 84443 ASSAY THYROID STIM HORMONE: CPT

## 2024-01-22 PROCEDURE — 80061 LIPID PANEL: CPT

## 2024-01-25 ENCOUNTER — MED REC SCAN ONLY (OUTPATIENT)
Dept: INTERNAL MEDICINE CLINIC | Facility: CLINIC | Age: 69
End: 2024-01-25

## 2024-02-22 RX ORDER — FENOFIBRATE 134 MG/1
1 CAPSULE ORAL DAILY
Qty: 90 CAPSULE | Refills: 3 | Status: SHIPPED | OUTPATIENT
Start: 2024-02-22

## 2024-02-22 NOTE — TELEPHONE ENCOUNTER
Refill passed per [facility] protocol.   Requested Prescriptions   Pending Prescriptions Disp Refills    Fenofibrate 134 MG Oral Cap 90 capsule 2     Sig: Take 1 capsule by mouth daily.       Cholesterol Medication Protocol Passed - 2/20/2024  7:24 AM        Passed - ALT < 80     Lab Results   Component Value Date    ALT 30 01/22/2024             Passed - ALT resulted within past year        Passed - Lipid panel within past 12 months     Lab Results   Component Value Date    CHOLEST 105 01/22/2024    TRIG 214 (H) 01/22/2024    HDL 34 (L) 01/22/2024    LDL 37 01/22/2024    VLDL 29 01/22/2024    NONHDLC 71 01/22/2024             Passed - In person appointment or virtual visit in the past 12 mos or appointment in next 3 mos     Recent Outpatient Visits              1 month ago Diabetes mellitus type 2 without retinopathy (HCC)    [facility] Brandon Lewis MD    Office Visit    1 month ago Type 2 diabetes mellitus with other specified complication, unspecified whether long term insulin use ([facility]    5 months ago Type 2 diabetes mellitus with other specified complication, unspecified whether long term insulin use ([facility]    [facility]    8 months ago Pure hypercholesterolemia    [facility] Brandon Lewsi MD    Office Visit    10 months ago Type 2 diabetes mellitus with other specified complication, unspecified whether long term insulin use ([facility]          Future Appointments         Provider Department Appt Notes    In 4 months Kiersten Cortez MD [facility] 6 month follow up on previous visit    In 4 months  Brandon Lewis MD [facility] [place]                    Recent Outpatient Visits              1 month ago Diabetes mellitus type 2 without retinopathy ([facility])    [facility] [place] [place] [facility]    Office Visit    1 month ago Type 2 diabetes mellitus with other specified complication, unspecified whether long term insulin use ([facility])    [facility]    Office Visit    5 months ago Type 2 diabetes mellitus with other specified complication, unspecified whether long term insulin use ([facility])    [facility]    Office Visit    8 months ago Pure hypercholesterolemia    [facility]    Office Visit    10 months ago Type 2 diabetes mellitus with other specified complication, unspecified whether long term insulin use ([facility])    [facility]    [facility] Visit           Future Appointments         Provider Department Appt Notes    In 4 months Kiersten Cortez MD [facility] [place] 6 month follow up on previous visit    In 4 months Bradnon Lewis [facility]

## 2024-02-28 DIAGNOSIS — E11.9 TYPE 2 DIABETES MELLITUS WITHOUT COMPLICATION, WITHOUT LONG-TERM CURRENT USE OF INSULIN (HCC): ICD-10-CM

## 2024-02-29 NOTE — TELEPHONE ENCOUNTER
Refill passed per [facility] protocol.     Requested Prescriptions   Pending Prescriptions Disp Refills    metFORMIN 500 MG Oral Tab 360 tablet 2     Sig: Take 2 tablets (1,000 mg total) by mouth 2 (two) times daily with meals.       Diabetes Medication Protocol Passed - 2/28/2024  8:19 PM        Passed - Last A1C < 7.5 and within past 6 months     Lab Results   Component Value Date    A1C 6.4 (A) 01/03/2024             Passed - In person appointment or virtual visit in the past 6 mos or appointment in next 3 mos     Recent Outpatient Visits              1 month ago Diabetes mellitus type 2 without retinopathy (HCC)    [facility] Brandon Lewis MD    Office Visit    1 month ago Type 2 diabetes mellitus with other specified complication, unspecified whether long term insulin use ([facility]    Office Visit    5 months ago Type 2 diabetes mellitus with other specified complication, unspecified whether long term insulin use ([facility]    8 months ago Pure hypercholesterolemia    [facility] Brandon Lewis MD    Office Visit    10 months ago Type 2 diabetes mellitus with other specified complication, unspecified whether long term insulin use ([facility])    [facility]    Office Visit          Future Appointments         Provider Department Appt Notes    In 4 months Kiersten Cortez MD [facility] 6 month follow up on previous visit    In 4 months Brandon Lewis MD [facility]                Passed - Microalbumin procedure in past 12 months or taking ACE/ARB        Passed - EGFRCR or GFRNAA > 50     GFR  Evaluation  EGFRCR: 69 , resulted on 1/22/2024          Passed - GFR in the past 12 months

## 2024-04-19 RX ORDER — GLIMEPIRIDE 1 MG/1
1 TABLET ORAL
Qty: 90 TABLET | Refills: 0 | Status: SHIPPED | OUTPATIENT
Start: 2024-04-19

## 2024-05-13 ENCOUNTER — MED REC SCAN ONLY (OUTPATIENT)
Dept: INTERNAL MEDICINE CLINIC | Facility: CLINIC | Age: 69
End: 2024-05-13

## 2024-06-20 RX ORDER — GLIMEPIRIDE 1 MG/1
1 TABLET ORAL
Qty: 90 TABLET | Refills: 0 | Status: SHIPPED | OUTPATIENT
Start: 2024-06-20

## 2024-07-08 ENCOUNTER — OFFICE VISIT (OUTPATIENT)
Dept: ENDOCRINOLOGY CLINIC | Facility: CLINIC | Age: 69
End: 2024-07-08
Payer: COMMERCIAL

## 2024-07-08 ENCOUNTER — TELEPHONE (OUTPATIENT)
Dept: ENDOCRINOLOGY CLINIC | Facility: CLINIC | Age: 69
End: 2024-07-08

## 2024-07-08 VITALS
DIASTOLIC BLOOD PRESSURE: 86 MMHG | HEART RATE: 75 BPM | WEIGHT: 213 LBS | HEIGHT: 70 IN | BODY MASS INDEX: 30.49 KG/M2 | SYSTOLIC BLOOD PRESSURE: 156 MMHG

## 2024-07-08 DIAGNOSIS — E11.69 TYPE 2 DIABETES MELLITUS WITH OTHER SPECIFIED COMPLICATION, UNSPECIFIED WHETHER LONG TERM INSULIN USE (HCC): ICD-10-CM

## 2024-07-08 DIAGNOSIS — E03.8 SUBCLINICAL HYPOTHYROIDISM: ICD-10-CM

## 2024-07-08 DIAGNOSIS — E78.5 DYSLIPIDEMIA: Primary | ICD-10-CM

## 2024-07-08 LAB
GLUCOSE BLOOD: 155
HEMOGLOBIN A1C: 5.7 % (ref 4.3–5.6)
TEST STRIP LOT #: NORMAL NUMERIC

## 2024-07-08 PROCEDURE — 3061F NEG MICROALBUMINURIA REV: CPT | Performed by: INTERNAL MEDICINE

## 2024-07-08 PROCEDURE — 1159F MED LIST DOCD IN RCRD: CPT | Performed by: INTERNAL MEDICINE

## 2024-07-08 PROCEDURE — 3079F DIAST BP 80-89 MM HG: CPT | Performed by: INTERNAL MEDICINE

## 2024-07-08 PROCEDURE — 3044F HG A1C LEVEL LT 7.0%: CPT | Performed by: INTERNAL MEDICINE

## 2024-07-08 PROCEDURE — 3008F BODY MASS INDEX DOCD: CPT | Performed by: INTERNAL MEDICINE

## 2024-07-08 PROCEDURE — 1160F RVW MEDS BY RX/DR IN RCRD: CPT | Performed by: INTERNAL MEDICINE

## 2024-07-08 PROCEDURE — 3077F SYST BP >= 140 MM HG: CPT | Performed by: INTERNAL MEDICINE

## 2024-07-08 PROCEDURE — 82947 ASSAY GLUCOSE BLOOD QUANT: CPT | Performed by: INTERNAL MEDICINE

## 2024-07-08 PROCEDURE — 83036 HEMOGLOBIN GLYCOSYLATED A1C: CPT | Performed by: INTERNAL MEDICINE

## 2024-07-08 PROCEDURE — 99214 OFFICE O/P EST MOD 30 MIN: CPT | Performed by: INTERNAL MEDICINE

## 2024-07-08 NOTE — TELEPHONE ENCOUNTER
Hello, Hope you are doing well.  I saw the patient in the office today.  His blood pressure remains elevated.  I discussed starting a medication patient is seeing you in the office next week and will prefer to discuss this with you.  Just wanted to let you know.  Thanks.

## 2024-07-08 NOTE — PROGRESS NOTES
Return Office Visit     CHIEF COMPLAINT:    DM   Dyslipidemia  Subclinical hypothyroidism    HISTORY OF PRESENT ILLNESS:  Nacho Will is a 69 year old male who presents for follow up for DM.      DM HISTORY  Diagnosed: around age  59     HISTORY OF DIABETES COMPLICATIONS: :  History of Retinopathy:denies, CRVO Left  last eye exam:  2024  History of Neuropathy: yes.   History of Nephropathy: no      ASSOCIATED COMPLICATIONS:   HTN: denies  Hyperlipidemia: yes  Coronary Artery Disease:  denies  Cerebrovascular Disease: denies        HOME GLUCOSE READINGS:   Checks a few times a week, alternates timings  Fastin-150  Bedtime: average around 150    No low BG under 70          CURRENT DIABETIC MEDICATIONS INCLUDE:  MTF  1000 Two tabs BID  Amaryl 2 mg BF and 1 mg dinner     MEALS:  3-4 meals a day  Dietary compliance with a low carb diet has been moderate    EXERCISE:  No regular exercise, but is very active         CURRENT MEDICATION:    Current Outpatient Medications   Medication Sig Dispense Refill    glimepiride 1 MG Oral Tab Take 1 tablet (1 mg total) by mouth daily with dinner. 90 tablet 0    metFORMIN 500 MG Oral Tab Take 2 tablets (1,000 mg total) by mouth 2 (two) times daily with meals. 360 tablet 3    glimepiride 2 MG Oral Tab Take 1 tablet (2 mg total) by mouth daily with breakfast. 90 tablet 3    Fenofibrate 134 MG Oral Cap Take 1 capsule by mouth daily. 90 capsule 3    atorvastatin 10 MG Oral Tab Take 1 tablet (10 mg total) by mouth every evening. 90 tablet 3    vitamin E 600 UNITS Oral Cap Take 1 capsule (600 Units total) by mouth daily.      Multiple Vitamins-Minerals (MULTI-VITAMIN/MINERALS) Oral Tab Take 1 tablet by mouth daily.      [employer] LANCETS ULTRATHIN 30G Does not apply Misc Test by Intradermal route 2 times every day 100 each 11    aspirin 81 MG Oral Chew Tab Chew 1 tablet (81 mg total) by mouth daily.      Glucosamine-Chondroitin 750-600 MG Oral Tab Take 1 tablet by mouth daily.            ALLERGY:  Allergies   Allergen Reactions    Penicillins      Other reaction(s): Hives       PAST MEDICAL, SOCIAL AND FAMILY HISTORY:  See past medical history marked as reviewed.  See past surgical history marked as reviewed.  See past family history marked as reviewed.  See past social history marked as reviewed.    ASSESSMENTS:     REVIEW OF SYSTEMS:  Constitutional: Negative for: fever, fatigue, cold/heat intolerance, + weight loss intentional   Eyes: Negative for:  Visual changes, proptosis, blurring  ENT: Negative for:  dysphagia, neck swelling, dysphonia  Respiratory: Negative for:  dyspnea, cough  Cardiovascular: Negative for:  chest pain, palpitations, orthopnea  GI: Negative for:  abdominal pain, nausea, vomiting, diarrhea, constipation, bleeding  Neurology: Negative for: headache, numbness, weakness  Genito-Urinary: Negative for: dysuria, frequency  Psychiatric: Negative for:  depression, anxiety  Hematology/Lymphatics: Negative for: bruising, lower extremity edema  Endocrine: Negative for: polyuria, polydypsia  Skin: Negative for: rash, blister, cellulitis,       PHYSICAL EXAM:   Vitals:    07/08/24 0919   BP: 151/74   Pulse: 72   Weight: 213 lb (96.6 kg)   Height: 5' 10\" (1.778 m)     BMI: Body mass index is 30.56 kg/m².         General Appearance:  alert, well developed, in no acute distress  Head: Atraumatic  Eyes:  normal conjunctivae, sclera., normal sclera and normal pupils  Throat/Neck: normal sound to voice. Normal hearing, normal speech  Respiratory:  Speaking in full sentences, non-labored. no increased work of breathing, no audible wheezing    Neuro: motor grossly intact, moving all extremities without difficulty  Psychiatric:  oriented to time, self, and place  Extremities:   Bilateral barefoot skin diabetic exam is normal, visualized feet and the appearance is normal.  Bilateral monofilament/sensation of both feet is normal.  Pulsation pedal pulse exam of both lower legs/feet is  normal as well.          DATA:         Pertinent data reviewed  a1c  5.7 % ( 7/2024)     ASSESSMENT AND PLAN:     1. Type 2 DM:     Plan:  Discussed the pathogenesis, natural course of diabetes. Patient understands the importance of glycemic control and the implications of uncontrolled diabetes including Diabetic ketoacidosis and various micro vascular and macrovascular complications.           a). Medications:        Metformin 1000 mg BID  Take with food  GI SE        Amaryl  2 mg BF and 1 mg with dinner--> amaryl 2 mg daily only   Counselled regarding risk of hypoglycemia associated with use.      I reviewed GLP agonists and SGLT 2 inhibitors, however these are tier 3 and he states he has enquired about the cost and they are extensive    To call if he has any low Bg under 80     b). No h/o Nephropathy :  Ur MA was slightly high in the past , normal on last check. Continued good BG and BP control  c). Discussed importance of annual eye exams  d). Foot exam: Daily feet exam explained  e). BG log maintainence explained in great detail, to get log and glucometer on next visit.  f). Life style changes reviewed  g). Hypoglycemia recognition and management discussed     2. Patient’s BP is slightly high today  Low salt diet, monitor BP at home  States he was started on lisinopril but is not on it any more, states he was concerned about SE  I offered prescribing another medication , patient declined , states he will like to discuss with Dr. Lewis who is seeing next week   Reports he had cardiac JONES which was okay  Understands implications of high BP including CAD and CVA  Will continue to address  3. Dyslipidemia  A) Discussed lifestyle modifications including reductions in dietary total and saturated fat, weight loss, aerobic exercise, and eating a diet rich in fruits and vegetables.  B) Statin therapy  Discussed the potential side effects of statins including muscle and liver injury.  TG high: low fat diet, c/w  fenofibrate no SE  Fasting lipid panel reviewed  4. High TSH , normal Free T4  Clinically no weight gain, constipation, cold intolerance, fatigue  Will repeat labs      RTC in 5 months  Bib with BG as discussed       Orders Placed This Encounter   Procedures    POC HemoCue Glucose 201 (Finger stick glucose)    POC Glycohemoglobin [99598]    TSH W Reflex To Free T4           Kiersten Cortez MD

## 2024-08-01 DIAGNOSIS — E78.00 PURE HYPERCHOLESTEROLEMIA: ICD-10-CM

## 2024-08-03 ENCOUNTER — LAB ENCOUNTER (OUTPATIENT)
Dept: LAB | Facility: HOSPITAL | Age: 69
End: 2024-08-03
Attending: INTERNAL MEDICINE
Payer: MEDICARE

## 2024-08-03 DIAGNOSIS — E03.8 SUBCLINICAL HYPOTHYROIDISM: ICD-10-CM

## 2024-08-03 DIAGNOSIS — E11.69 TYPE 2 DIABETES MELLITUS WITH OTHER SPECIFIED COMPLICATION, UNSPECIFIED WHETHER LONG TERM INSULIN USE (HCC): ICD-10-CM

## 2024-08-03 DIAGNOSIS — E78.5 DYSLIPIDEMIA: ICD-10-CM

## 2024-08-03 LAB
ALBUMIN SERPL-MCNC: 4.4 G/DL (ref 3.2–4.8)
ALBUMIN/GLOB SERPL: 1.4 {RATIO} (ref 1–2)
ALP LIVER SERPL-CCNC: 32 U/L
ALT SERPL-CCNC: 24 U/L
ANION GAP SERPL CALC-SCNC: 6 MMOL/L (ref 0–18)
AST SERPL-CCNC: 19 U/L (ref ?–34)
BILIRUB SERPL-MCNC: 0.8 MG/DL (ref 0.2–1.1)
BUN BLD-MCNC: 12 MG/DL (ref 9–23)
BUN/CREAT SERPL: 10.7 (ref 10–20)
CALCIUM BLD-MCNC: 9.7 MG/DL (ref 8.7–10.4)
CHLORIDE SERPL-SCNC: 108 MMOL/L (ref 98–112)
CHOLEST SERPL-MCNC: 111 MG/DL (ref ?–200)
CO2 SERPL-SCNC: 27 MMOL/L (ref 21–32)
CREAT BLD-MCNC: 1.12 MG/DL
CREAT UR-SCNC: 66.4 MG/DL
EGFRCR SERPLBLD CKD-EPI 2021: 71 ML/MIN/1.73M2 (ref 60–?)
FASTING PATIENT LIPID ANSWER: YES
FASTING STATUS PATIENT QL REPORTED: YES
GLOBULIN PLAS-MCNC: 3.1 G/DL (ref 2–3.5)
GLUCOSE BLD-MCNC: 136 MG/DL (ref 70–99)
HDLC SERPL-MCNC: 35 MG/DL (ref 40–59)
LDLC SERPL CALC-MCNC: 49 MG/DL (ref ?–100)
MICROALBUMIN UR-MCNC: 0.5 MG/DL
MICROALBUMIN/CREAT 24H UR-RTO: 7.5 UG/MG (ref ?–30)
NONHDLC SERPL-MCNC: 76 MG/DL (ref ?–130)
OSMOLALITY SERPL CALC.SUM OF ELEC: 294 MOSM/KG (ref 275–295)
POTASSIUM SERPL-SCNC: 4.5 MMOL/L (ref 3.5–5.1)
PROT SERPL-MCNC: 7.5 G/DL (ref 5.7–8.2)
SODIUM SERPL-SCNC: 141 MMOL/L (ref 136–145)
TRIGL SERPL-MCNC: 161 MG/DL (ref 30–149)
TSI SER-ACNC: 4.25 MIU/ML (ref 0.55–4.78)
VLDLC SERPL CALC-MCNC: 23 MG/DL (ref 0–30)

## 2024-08-03 PROCEDURE — 80053 COMPREHEN METABOLIC PANEL: CPT

## 2024-08-03 PROCEDURE — 82043 UR ALBUMIN QUANTITATIVE: CPT

## 2024-08-03 PROCEDURE — 84443 ASSAY THYROID STIM HORMONE: CPT

## 2024-08-03 PROCEDURE — 36415 COLL VENOUS BLD VENIPUNCTURE: CPT

## 2024-08-03 PROCEDURE — 80061 LIPID PANEL: CPT

## 2024-08-03 PROCEDURE — 82570 ASSAY OF URINE CREATININE: CPT

## 2024-08-06 RX ORDER — ATORVASTATIN CALCIUM 10 MG/1
10 TABLET, FILM COATED ORAL EVERY EVENING
Qty: 100 TABLET | Refills: 2 | Status: SHIPPED | OUTPATIENT
Start: 2024-08-06

## 2024-08-06 NOTE — TELEPHONE ENCOUNTER
Refill Per Protocol     Requested Prescriptions   Pending Prescriptions Disp Refills    ATORVASTATIN 10 MG Oral Tab [Pharmacy Med Name: Atorvastatin Calcium 10 MG Oral Tablet] 100 tablet 2     Sig: TAKE 1 TABLET BY MOUTH IN THE  EVENING       Cholesterol Medication Protocol Passed - 8/1/2024  9:05 PM        Passed - ALT < 80     Lab Results   Component Value Date    ALT 24 08/03/2024             Passed - ALT resulted within past year        Passed - Lipid panel within past 12 months     Lab Results   Component Value Date    CHOLEST 111 08/03/2024    TRIG 161 (H) 08/03/2024    HDL 35 (L) 08/03/2024    LDL 49 08/03/2024    VLDL 23 08/03/2024    NONHDLC 76 08/03/2024             Passed - In person appointment or virtual visit in the past 12 mos or appointment in next 3 mos     Recent Outpatient Visits              4 weeks ago Dyslipidemia    [facility] Kier[facility]    Office Visit    6 months ago Diabetes mellitus type 2 without retinopathy (HCC)    [facility] Brandon Lewis MD    Office Visit    7 months ago Type 2 diabetes mellitus with other specified complication, unspecified whether long term insulin use ([facility])    [facility]    Office Visit    10 months ago Type 2 diabetes mellitus with other specified complication, unspecified whether long term insulin use ([facility])    [facility] Kiersten Cortez MD    Office Visit    1 year ago Pure hypercholesterolemia    [facility] Brandon Lewis MD    Office Visit          Future Appointments         Provider Department Appt Notes    In 2 weeks Brandon Lewis MD [facility]     In 4 months Kiersten Cortez MD Critical access hospital 5 months                            Future Appointments         Provider Department Appt Notes    In 2 weeks Brandon Lewis MD [facility]     In 4 months Kiersten Cortez MD [facility], [facility] 5 months          Recent Outpatient Visits              4 weeks ago Dyslipidemia    [facility] [place] Kiersten Cortez MD    Office Visit    6 months ago Diabetes mellitus type 2 without retinopathy (HCC)    [facility] [place] Brandon Lewis MD    Office Visit    7 months ago Type 2 diabetes mellitus with other specified complication, unspecified whether long term insulin use ([facility])    [facility] Kiersten Redding MD    Office Visit    10 months ago Type 2 diabetes mellitus with other specified complication, unspecified whether long term insulin use ([facility])    [facility] [place] Kiersten Cortez MD    Office Visit    1 year ago Pure hypercholesterolemia    [facility], [facility] [place] Barndon Lewis MD    Office Visit

## 2024-08-21 RX ORDER — GLIMEPIRIDE 2 MG/1
2 TABLET ORAL
Qty: 90 TABLET | Refills: 3 | Status: SHIPPED | OUTPATIENT
Start: 2024-08-21

## 2024-08-21 NOTE — TELEPHONE ENCOUNTER
Refill passed per [facility] protocol.     Requested Prescriptions   Pending Prescriptions Disp Refills    glimepiride 2 MG Oral Tab 90 tablet 3     Sig: Take 1 tablet (2 mg total) by mouth daily with breakfast.       Diabetes Medication Protocol Passed - 8/21/2024  6:43 AM        Passed - Last A1C < 7.5 and within past 6 months     Lab Results   Component Value Date    A1C 5.7 (A) 07/08/2024             Passed - In person appointment or virtual visit in the past 6 mos or appointment in next 3 mos     Recent Outpatient Visits              1 month ago Dyslipidemia    [facility] Kiersten Cortez MD    Office Visit    7 months ago Diabetes mellitus type 2 without retinopathy ([facility])    [facility] Brandon Lewis MD    Office Visit    7 months ago Type 2 diabetes mellitus with other specified complication, unspecified whether long term insulin use ([facility])    [facility]    Office Visit    11 months ago Type 2 diabetes mellitus with other specified complication, unspecified whether long term insulin use ([facility])    [facility] [place] Kiersten Cortez MD    Office Visit    1 year ago Pure hypercholesterolemia    [facility] Brandon Lewis MD    Office Visit          Future Appointments         Provider Department Appt Notes    In 5 days Brandon Lewis MD [facility]     In 3 months iKersten Cortez MD [facility] 5 months                    Passed - Microalbumin procedure in past 12 months or taking ACE/ARB        Passed - EGFRCR or GFRNAA > 50     GFR Evaluation  EGFRCR: 71 , resulted on 8/3/2024          Passed - GFR in the past 12 months             @[facility]@      @[facility]@

## 2024-08-21 NOTE — TELEPHONE ENCOUNTER
Refill passed per [facility] protocol.     Requested Prescriptions   Pending Prescriptions Disp Refills    glimepiride 2 MG Oral Tab 90 tablet 3     Sig: Take 1 tablet (2 mg total) by mouth daily with breakfast.       Diabetes Medication Protocol Passed - 8/21/2024  6:43 AM        Passed - Last A1C < 7.5 and within past 6 months     Lab Results   Component Value Date    A1C 5.7 (A) 07/08/2024             Passed - In person appointment or virtual visit in the past 6 mos or appointment in next 3 mos     Recent Outpatient Visits              1 month ago Dyslipidemia    [facility] Kiersten Cortez MD    Office Visit    7 months ago Diabetes mellitus type 2 without retinopathy ([facility])    [facility] Brandon Lewis MD    Office Visit    7 months ago Type 2 diabetes mellitus with other specified complication, unspecified whether long term insulin use ([facility])    [facility]    Office Visit    11 months ago Type 2 diabetes mellitus with other specified complication, unspecified whether long term insulin use ([facility])    [facility] [place] Kiersten Cortez MD    Office Visit    1 year ago Pure hypercholesterolemia    [facility] Brandon Lewis MD    Office Visit          Future Appointments         Provider Department Appt Notes    In 5 days Brandon Lewis MD [facility]     In 3 months Kiersten Cortez MD [facility] 5 months                    Passed - Microalbumin procedure in past 12 months or taking ACE/ARB        Passed - EGFRCR or GFRNAA > 50     GFR Evaluation  EGFRCR: 71 , resulted on 8/3/2024          Passed - GFR in the past 12 months             @[facility]@      @[facility]@

## 2024-08-26 ENCOUNTER — OFFICE VISIT (OUTPATIENT)
Dept: INTERNAL MEDICINE CLINIC | Facility: CLINIC | Age: 69
End: 2024-08-26

## 2024-08-26 VITALS
DIASTOLIC BLOOD PRESSURE: 74 MMHG | HEART RATE: 71 BPM | HEIGHT: 70 IN | SYSTOLIC BLOOD PRESSURE: 150 MMHG | BODY MASS INDEX: 29.37 KG/M2 | OXYGEN SATURATION: 98 % | WEIGHT: 205.19 LBS

## 2024-08-26 DIAGNOSIS — E11.9 DIABETES MELLITUS TYPE 2 WITHOUT RETINOPATHY (HCC): Primary | ICD-10-CM

## 2024-08-26 DIAGNOSIS — I10 PRIMARY HYPERTENSION: ICD-10-CM

## 2024-08-26 DIAGNOSIS — E78.00 PURE HYPERCHOLESTEROLEMIA: ICD-10-CM

## 2024-08-26 PROCEDURE — 1159F MED LIST DOCD IN RCRD: CPT | Performed by: INTERNAL MEDICINE

## 2024-08-26 PROCEDURE — 99214 OFFICE O/P EST MOD 30 MIN: CPT | Performed by: INTERNAL MEDICINE

## 2024-08-26 PROCEDURE — 3061F NEG MICROALBUMINURIA REV: CPT | Performed by: INTERNAL MEDICINE

## 2024-08-26 PROCEDURE — 3078F DIAST BP <80 MM HG: CPT | Performed by: INTERNAL MEDICINE

## 2024-08-26 PROCEDURE — 3008F BODY MASS INDEX DOCD: CPT | Performed by: INTERNAL MEDICINE

## 2024-08-26 PROCEDURE — G2211 COMPLEX E/M VISIT ADD ON: HCPCS | Performed by: INTERNAL MEDICINE

## 2024-08-26 PROCEDURE — 1125F AMNT PAIN NOTED PAIN PRSNT: CPT | Performed by: INTERNAL MEDICINE

## 2024-08-26 PROCEDURE — 3077F SYST BP >= 140 MM HG: CPT | Performed by: INTERNAL MEDICINE

## 2024-08-26 NOTE — PATIENT INSTRUCTIONS
Please check your blood pressure at home.  If it is consistently above 140/90, please contact me.  Thank you    Controlling High Blood Pressure   High blood pressure (hypertension) is often called the silent killer. This is because many people who have it, don’t know it. It can be very dangerous. High blood pressure can raise your risk of heart attack, stroke, heart disease, and heart failure. Controlling your blood pressure can lower your risk of these problems. It's important to check yourblood pressure regularly. It can save your life.   Blood pressure measurements are given as 2 numbers. Systolic blood pressure is the upper number. This is the pressure when the heart contracts. Diastolic blood pressure is the lower number. This is the pressure when the heartrelaxes between beats.   Blood pressure is grouped like this:   Normal blood pressure. This is systolic of less than 120 and diastolic of less than 80 (120/80).  Elevated blood pressure.  This is systolic of 120 to 129 and diastolic less than 80.  Stage 1 high blood pressure.  This is systolic of 130 to 139 or diastolic between 80 to 89.  Stage 2 high blood pressure.  This is systolic of 140 or higher or diastolic of 90 or higher.  A heart-healthy lifestyle can help you control your blood pressure withoutmedicines. Below are some things you can do to have a heart-healthy lifestyle.     Eat heart-healthy foods   Choose low-salt, low-fat foods. Limit your sodium to 2,300 mg per day or the amount advised by your healthcare provider.  Limit canned, dried, cured, packaged, and fast foods. These can contain a lot of salt.  Eat 8 to 10 servings of fruits and vegetables every day.  Choose lean meats, fish, or chicken.  Eat whole-grain pasta, brown rice, and beans.  Eat 2 to 3 servings of low-fat or fat-free dairy products.  Ask your doctor about the DASH eating plan. This plan helps reduce blood pressure.  When you go to a restaurant, ask that your meal be made with  no added salt.    Stay at a healthy weight   Ask your healthcare provider how many calories to eat a day. Then stick to that number.  Ask your provider what weight range is healthiest for you. If you are overweight, a weight loss of only 3% to 5% of your body weight can help lower blood pressure. A good weight loss goal is to lose 10% of your body weight in a year.  Limit snacks and sweets.  Get regular exercise.    Get more active   Find activities you enjoy. They can be done alone or with friends or family. Try bicycling, dancing, walking, or jogging.  Park farther away from building entrances to walk more.  Use stairs instead of the elevator.  When you can, walk or bike instead of driving.  [place] leaves, garden, or do household repairs.  Be active at a moderate to vigorous level of physical activity for at least 30 minutes a day for at least 5 days a week.     Manage stress   Make time to relax and enjoy life. Find time to laugh.  Talk about your concerns with your loved ones and your healthcare provider.  Visit with family and friends, and keep up with hobbies.    Limit alcohol and quit smoking   Men should have no more than 2 drinks per day.  Women should have no more than 1 drink per day.  If you smoke, make a plan to stop. Talk with your healthcare provider for help. Smoking greatly raises your risk for heart disease and stroke. Ask your provider about stop-smoking programs and other support.    Blood pressure medicines  If your lifestyle changes aren’t enough, your healthcare provider may prescribe high blood pressure medicine. Take all medicines as prescribed. If you have any questions about yourmedicines, ask your provider before stopping or changing them.   [employer] last reviewed this educational content on12/1/2021 © 2000-2022 [employer]. All rights reserved. This information is not intended as a substitute for professional medical care. Always follow yourClinton Memorial Hospitalcare professional's  instruction    Video Linchpin™  What is High Blood Pressure?  Understand what blood pressure is, the health risks of having high blood pressure, the factors that put you at risk for having high blood pressure, and the importance of working with your healthcare provider to control it.  To watch the video:  Scan the QR code  Using your mobile device, scan the following code:  OR  Go to the website:  [employer]  Enter the prescription code:  3414E    © 6611-4513 [employer]. All rights reserved. This information is not intended as a substitute for professional medical care. Always follow your healthcare professional's instructions.    Video [id number]™  Eating Well with High Blood Pressure  Certain foods can make your blood pressure go too high. Watch and learn how easy it is to have delicious meals without harming your health.     To watch the video:  Scan the QR code  Using your mobile device, scan the following code:  OR  Go to the website:  www.kramesvideo.com  Enter the prescription code:   QIX       © 2000-2022 [employer]. All rights reserved. This information is not intended as a substitute for professional medical care. Always follow your healthcare professional's instructions.    Taking Your Blood Pressure  Blood pressure is the force of blood against the artery wall as it moves from the heart through the blood vessels. You can take your own blood pressure reading using a digital monitor. Take your readings the same each time, usingthe same arm. Take readings as often as your healthcare provider advises.   About blood pressure monitors  Blood pressure monitors are designed for certain ages and cases. You can find monitors for older adults, for pregnant women, and for children. Make sure theone you choose is the right one for your age and situation.   Experts advise an automatic cuff monitor that fits on your upper arm (bicep). The cuff should fit your arm  size. A cuff that’s too large or too small won't give an accurate reading. Measure around yourupper arm to find your size.   Monitors that attach to your finger or wrist are not as accurate as monitorsfor your upper arm.   Ask your healthcare provider for help in choosing a monitor. Bring your monitorto your next provider visit if you need help in using it the correct way.   The steps below are general instructions for using an automatic digitalmonitor.   Step 1. Relax    Take your blood pressure at the same time every day, such as in the morning or evening. Or take it at the time your healthcare provider advises.  Wait at least 30 minutes after smoking, eating, or exercising. Don't drink coffee, tea, soda, or other caffeinated drinks before checking your blood pressure. Use the restroom beforehand.  Sit comfortably at a table with both feet on the floor. Don't cross your legs or feet. Place the monitor near you.  Rest for at least 5 minutes before you begin. Make sure there are no distractions. This includes TV, cell phones, and other electronics. Wait to have conversations with others until after you measure you blood pressure.  Step 2. Wrap the cuff    Place your arm on the table, palm up. Your arm should be at the level of your heart. Wrap the cuff around your upper arm, just above your elbow. It’s best done on bare skin, not over clothing. Most cuffs will show you where the blood vessel in the middle of the arm at the inner side of the elbow (the brachial artery) should line up with the cuff. Look in your monitor's instruction booklet for an illustration. You can also bring your cuff to your healthcare provider and have them show you how to correctly place the cuff.  Step 3. Inflate the cuff    Push the button that starts the pump.  The cuff will tighten, then loosen.  The numbers will change. When they stop changing, your blood pressure reading will appear.  Take 2 or 3 readings 1 minute apart, or as advised  by your provider.  Step 4. Write down the results of each reading    Write down your blood pressure numbers for each reading. Note the date and time. Keep your results in 1 place, such as a notebook. Even if your monitor has a built-in memory, keep a hard copy of the readings.  Remove the cuff from your arm. Turn off the machine.  Bring your blood pressure records with you to each provider visit.  If you start a new blood pressure medicine, note the day you started the new medicine. Also note the day if you change the dose of your medicine. Measure your blood pressure before your take your medicine. This information goes on your blood pressure recording sheet. This will help your provider check how well the medicine changes are working.  Ask your provider what numbers mean that you should call them. Also ask what numbers mean that you should get help right away.  [employer] last reviewed this educational content on12/1/2021 © 2000-2022 [employer]. All rights reserved. This information is not intended as a substitute for professional medical care. Always follow yourhealthcare professional's instructions.

## 2024-08-27 NOTE — PROGRESS NOTES
Nacho Will is a 69 year old male.  Chief Complaint   Patient presents with    Follow - Up     HPI:   69-year-old pleasant gentleman here for follow-up.  He reports that he is doing well.  He denies any hypoglycemic events.  He denies any chest pain or shortness of breath.  Denies any abdominal pain nausea vomiting.  He is taking his medications regularly.      Current Outpatient Medications   Medication Sig Dispense Refill    glimepiride 2 MG Oral Tab Take 1 tablet (2 mg total) by mouth daily with breakfast. 90 tablet 3    atorvastatin 10 MG Oral Tab Take 1 tablet (10 mg total) by mouth every evening. 100 tablet 2    metFORMIN 500 MG Oral Tab Take 2 tablets (1,000 mg total) by mouth 2 (two) times daily with meals. 360 tablet 3    Fenofibrate 134 MG Oral Cap Take 1 capsule by mouth daily. 90 capsule 3    vitamin E 600 UNITS Oral Cap Take 1 capsule (600 Units total) by mouth daily.      Multiple Vitamins-Minerals (MULTI-VITAMIN/MINERALS) Oral Tab Take 1 tablet by mouth daily.      [employer] LANCETS ULTRATHIN 30G Does not apply Misc Test by Intradermal route 2 times every day 100 each 11    aspirin 81 MG Oral Chew Tab Chew 1 tablet (81 mg total) by mouth daily.      Glucosamine-Chondroitin 750-600 MG Oral Tab Take 1 tablet by mouth daily.        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)    Diabetes (HCC)    High cholesterol    Hyperlipidemia      Past Surgical History:   Procedure Laterality Date    Back surgery      epidural abscess of spinal cord    Colonoscopy      Had one done in [place] 5 years    Electrocardiogram, complete      Scanned to media tab - DOS 09-      Social History:  Social History     Socioeconomic History    Marital status:    Tobacco Use    Smoking status: Never    Smokeless tobacco: Never   Vaping Use    Vaping status: Never Used   Substance and Sexual Activity    Alcohol use: Yes     Comment: 2 drinks monthly    Drug use: No    Sexual activity:  Yes   Other Topics Concern    Caffeine Concern Yes     Comment: Coffee 6 cups daily      Family History   Problem Relation Age of Onset    Lung Disorder Mother         smoker 2ppd x 40 yrs    Heart Disease Father         MI    Heart Disorder Father         CABG x 4/AAA repair    Heart Surgery Father     Diabetes Sister         borderline    Cancer Sister         non hodgkins lymphoma    Psychiatric Sister         Bippolar    Other (recovering ETOH) Brother     Other (ETOH homeless) Brother     Glaucoma Neg     Macular degeneration Neg       Allergies   Allergen Reactions    Penicillins      Other reaction(s): Hives        REVIEW OF SYSTEMS:   Review of Systems   Review of Systems   Constitutional: Negative for activity change, appetite change and fever.   HENT: Negative for congestion and voice change.    Respiratory: Negative for cough and shortness of breath.    Cardiovascular: Negative for chest pain.   Gastrointestinal: Negative for abdominal distention, abdominal pain and vomiting.   Genitourinary: Negative for hematuria.   Skin: Negative for wound.   Psychiatric/Behavioral: Negative for behavioral problems.   Wt Readings from Last 5 Encounters:   08/26/24 205 lb 3.2 oz (93.1 kg)   07/08/24 213 lb (96.6 kg)   01/16/24 214 lb 12.8 oz (97.4 kg)   01/03/24 214 lb (97.1 kg)   09/18/23 214 lb (97.1 kg)     Body mass index is 29.44 kg/m².      EXAM:   /74   Pulse 71   Ht 5' 10\" (1.778 m)   Wt 205 lb 3.2 oz (93.1 kg)   SpO2 98%   BMI 29.44 kg/m²   Physical Exam   Constitutional:       Appearance: Normal appearance.   HENT:      Head: Normocephalic.   Eyes:      Conjunctiva/sclera: Conjunctivae normal.   Cardiovascular:      Rate and Rhythm: Normal rate and regular rhythm.      Heart sounds: Normal heart sounds. No murmur heard.  Pulmonary:      Effort: Pulmonary effort is normal.      Breath sounds: Normal breath sounds. No rhonchi or rales.   Abdominal:      General: Bowel sounds are normal.       Palpations: Abdomen is soft.      Tenderness: There is no abdominal tenderness.   Musculoskeletal:      Cervical back: Neck supple.      Right lower leg: No edema.      Left lower leg: No edema.   Skin:     General: Skin is warm and dry.   Neurological:      General: No focal deficit present.      Mental Status: He is alert and oriented to person, place, and time. Mental status is at baseline.   Psychiatric:         Mood and Affect: Mood normal.         Behavior: Behavior normal.       ASSESSMENT AND PLAN:   1. Diabetes mellitus type 2 without retinopathy (HCC)  Lab Results   Component Value Date    A1C 5.7 (A) 07/08/2024    LDL 49 08/03/2024    MICROALBCREA 7.5 08/03/2024   Continue statins.  Up-to-date on eye exam and foot exam.  Will monitor hemoglobin A1c.      2. Pure hypercholesterolemia  Lab Results   Component Value Date    LDL 49 08/03/2024    AST 19 08/03/2024    ALT 24 08/03/2024      Encouraged patient to eat healthy.  Avoid fat fried foods and increase activity as tolerated.  We will monitor lipid profile and liver function test.      3. Primary hypertension  Lab Results   Component Value Date    CREATSERUM 1.12 08/03/2024    TSH 4.254 08/03/2024     Will continue to monitor blood pressure.  Encouraged patient to avoid salt.  Continue current medical regimen.      Plan: As above.  If everything is good, I will see him back in 6 months.  If there is any new concerns, he will contact me.      The patient indicates understanding of these issues and agrees to the plan.  No follow-ups on file.    This note was prepared using Dragon Medical voice recognition dictation software. As a result errors may occur. When identified these errors have been corrected. While every attempt is made to correct errors during dictation discrepancies may still exist.

## 2024-10-16 ENCOUNTER — APPOINTMENT (OUTPATIENT)
Dept: GENERAL RADIOLOGY | Facility: HOSPITAL | Age: 69
End: 2024-10-16
Attending: EMERGENCY MEDICINE
Payer: MEDICARE

## 2024-10-16 ENCOUNTER — NURSE TRIAGE (OUTPATIENT)
Dept: INTERNAL MEDICINE CLINIC | Facility: CLINIC | Age: 69
End: 2024-10-16

## 2024-10-16 ENCOUNTER — HOSPITAL ENCOUNTER (EMERGENCY)
Facility: HOSPITAL | Age: 69
Discharge: HOME | End: 2024-10-16
Attending: EMERGENCY MEDICINE
Payer: MEDICARE

## 2024-10-16 VITALS
WEIGHT: 208 LBS | DIASTOLIC BLOOD PRESSURE: 76 MMHG | TEMPERATURE: 98 F | HEIGHT: 70 IN | RESPIRATION RATE: 16 BRPM | SYSTOLIC BLOOD PRESSURE: 160 MMHG | BODY MASS INDEX: 29.78 KG/M2 | HEART RATE: 70 BPM | OXYGEN SATURATION: 97 %

## 2024-10-16 DIAGNOSIS — M79.661 PAIN IN RIGHT SHIN: ICD-10-CM

## 2024-10-16 DIAGNOSIS — M79.671 RIGHT FOOT PAIN: Primary | ICD-10-CM

## 2024-10-16 PROCEDURE — 99284 EMERGENCY DEPT VISIT MOD MDM: CPT

## 2024-10-16 PROCEDURE — 99283 EMERGENCY DEPT VISIT LOW MDM: CPT

## 2024-10-16 PROCEDURE — 73590 X-RAY EXAM OF LOWER LEG: CPT | Performed by: EMERGENCY MEDICINE

## 2024-10-16 PROCEDURE — 73630 X-RAY EXAM OF FOOT: CPT | Performed by: EMERGENCY MEDICINE

## 2024-10-16 RX ORDER — SENNA AND DOCUSATE SODIUM 50; 8.6 MG/1; MG/1
1 TABLET, FILM COATED ORAL DAILY
Qty: 30 TABLET | Refills: 0 | Status: SHIPPED | OUTPATIENT
Start: 2024-10-16

## 2024-10-16 RX ORDER — TRAMADOL HYDROCHLORIDE 50 MG/1
TABLET ORAL EVERY 6 HOURS PRN
Qty: 10 TABLET | Refills: 0 | Status: SHIPPED | OUTPATIENT
Start: 2024-10-16 | End: 2024-10-21

## 2024-10-16 RX ORDER — ACETAMINOPHEN 500 MG
1000 TABLET ORAL ONCE
Status: COMPLETED | OUTPATIENT
Start: 2024-10-16 | End: 2024-10-16

## 2024-10-16 RX ORDER — IBUPROFEN 400 MG/1
400 TABLET, FILM COATED ORAL ONCE
Status: COMPLETED | OUTPATIENT
Start: 2024-10-16 | End: 2024-10-16

## 2024-10-16 NOTE — ED QUICK NOTES
Patient provided with discharge instructions. Verbalized understanding for plan of care at home and follow up. All question and concerns addressed prior to discharge. Let pt know rx was sent to pharmacy. Post op shoe applied and toes were rita tape.

## 2024-10-16 NOTE — ED NOTES
Patient signed out at shift change.  Results for orders placed or performed in visit on 08/03/24   Comp Metabolic Panel [E]    Collection Time: 08/03/24  9:16 AM   Result Value Ref Range    Glucose 136 (H) 70 - 99 mg/dL    Sodium 141 136 - 145 mmol/L    Potassium 4.5 3.5 - 5.1 mmol/L    Chloride 108 98 - 112 mmol/L    CO2 27.0 21.0 - 32.0 mmol/L    Anion Gap 6 0 - 18 mmol/L    BUN 12 9 - 23 mg/dL    Creatinine 1.12 0.70 - 1.30 mg/dL    BUN/CREA Ratio 10.7 10.0 - 20.0    Calcium, Total 9.7 8.7 - 10.4 mg/dL    Calculated Osmolality 294 275 - 295 mOsm/kg    eGFR-Cr 71 >=60 mL/min/1.73m2    ALT 24 10 - 49 U/L    AST 19 <34 U/L    Alkaline Phosphatase 32 (L) 45 - 117 U/L    Bilirubin, Total 0.8 0.2 - 1.1 mg/dL    Total Protein 7.5 5.7 - 8.2 g/dL    Albumin 4.4 3.2 - 4.8 g/dL    Globulin  3.1 2.0 - 3.5 g/dL    A/G Ratio 1.4 1.0 - 2.0    Patient Fasting for CMP? Yes    Lipid Panel [E]    Collection Time: 08/03/24  9:16 AM   Result Value Ref Range    Cholesterol, Total 111 <200 mg/dL    HDL Cholesterol 35 (L) 40 - 59 mg/dL    Triglycerides 161 (H) 30 - 149 mg/dL    LDL Cholesterol 49 <100 mg/dL    VLDL 23 0 - 30 mg/dL    Non HDL Chol 76 <130 mg/dL    Patient Fasting for Lipid? Yes    Microalb/Creat Ratio, Random Urine [E]    Collection Time: 08/03/24  9:16 AM   Result Value Ref Range    Microalbumin, Urine 0.50 mg/dL    Creatinine Ur Random 66.40 mg/dL    Malb/Cre Calc 7.5 <=30.0 ug/mg   TSH W Reflex To Free T4    Collection Time: 08/03/24  9:16 AM   Result Value Ref Range    TSH 4.254 0.550 - 4.780 mIU/mL     Vitals:    10/16/24 1416   BP: 147/66   Pulse: 76   Resp: 18   Temp:      Patient signed out at shift change.  69-year-old male status post blunt injury with a branch on Saturday, today is Thursday.  Patient reported pain to the lateral aspect of right leg and right foot.  I was asked to follow with a tib-fib x-ray and foot x-ray.  Patient is ambulatory however reports pain to the distal aspect of his right lateral  leg and lateral right foot.  Mild bruising noted to right dorsal lateral toes.  Neurologically and vascularly intact with equal dorsalis pedis pulses on my exam.  Able to ambulate.  He reports a history of chronic back pain and reports recurrent back pain.  I suspect his symptoms are secondary to probable radicular lumbar pain, toe fracture and probable contusion.  Patient's toe rita taped to neighboring toe.  Encouraged to follow with podiatry as an outpatient.  Strict return precautions given  Will provide postop shoe.      Patient requests stronger pain medication other than OTC meds.  Tramadol prescribed.  He reports tolerating narcotics in the past      5/5 bilateral leg extension/flexion  5/5 bilateral knee extension  5/5 B foot dorsiflexion/plantarflexion    Sensory function intact symmetrically and bilaterally to lower extremities.    Normal gait.  Dorsalis pedis pulses 2+ bilaterally    Smiles    Strict return instructions given.  Compartment syndrome, acute vascular ischemia, tendon rupture, abscess, skin infection,among other life-threatening medical conditions considered and seems unlikely given patient's history, exam, and appearance.      Patient encouraged to follow-up with primary care provider in the next few days.  Advised to return to the emergency department for any worsening symptoms    Patient is non toxic appearing, is in no distress, hemodynamically stable.  Pt agrees and is aware of plan.         Tibia/fibula x-ray,independent interpretation of radiologic study completed by myself and awaiting formal radiologist interpretation:  No acute fracture or dislocation  foot x-ray, independent interpretation of radiologic study completed by myself and awaiting formal radiologist interpretation: Fourth toe fracture      XR TIBIA + FIBULA (2 VIEWS), RIGHT (CPT=73590)    Result Date: 10/16/2024  CONCLUSION:  1. No acute fracture or subluxation.  2. Osteoarthritis ankle and knee.  New line  atherosclerotic vascular calcification.    Dictated by (CST): Armando Weaver MD on 10/16/2024 at 2:40 PM     Finalized by (CST): Armando Weaver MD on 10/16/2024 at 2:41 PM          XR FOOT, COMPLETE (MIN 3 VIEWS), RIGHT (CPT=73630)    Result Date: 10/16/2024  CONCLUSION:  1. A minimally displaced intra-articular fracture lateral corner middle phalanx of 4th toe at the PIP joint which is of uncertain age.  No additional fractures. 2. Mild-to-moderate osteoarthritis of ankle, midfoot, 1st MTP joint, and some of the IP joints of the toes. 3. Calcaneal spurs. 4. Atherosclerotic vascular calcification.    Dictated by (CST): Armando Weaver MD on 10/16/2024 at 2:37 PM     Finalized by (CST): Armando Weaver MD on 10/16/2024 at 2:40 PM             Medical Decision Making  Amount and/or Complexity of Data Reviewed  External Data Reviewed: notes.  Radiology: independent interpretation performed. Decision-making details documented in ED Course.    Risk  Prescription drug management.

## 2024-10-16 NOTE — DISCHARGE INSTRUCTIONS
Return to ER for changes in color of skin, decreased pulses in that extremity, hard compartments. If you have an open wound associated with your fracture, signs of infection are redness associated with warmth/pain, puss from site, fevers of 100.4F or above.  Please elevate your your fracture above your heart when at rest to reduce swelling. Please follow with an orthopedist as soon as possible for further guidance regarding your fracture.    The Emergency Department is not intended to be a substitute for an effort to provide complete medical care. The imaging, if any, have often been interpreted on a preliminary basis pending final reading by the radiologist. If your blood pressure was greater than 140/90, please have this blood pressure rechecked by your primary care provider in the next several days.

## 2024-10-16 NOTE — ED INITIAL ASSESSMENT (HPI)
Patient sent by MD to r/o blood clot. C/o R leg pain since Sunday.   Patient reports tree limb falling on toe Saturday.   Denies swelling/redness/tenderness.

## 2024-10-16 NOTE — TELEPHONE ENCOUNTER
Action Requested: Summary for Provider     []  Critical Lab, Recommendations Needed  [] Need Additional Advice  []   FYI    []   Need Orders  [] Need Medications Sent to Pharmacy  []  Other     SUMMARY: Patient calling, had sudden onset of leg pain in calf, below knee in the back of the leg.  Patient denies swelling or discoloration although upon asking questions patient was cutting a tree in the back year over the weekend and big branch flew landed on foot. Patient thinks he broke his toe on right foot. Bruising noted in the right foot. No CP or SOB at this time. Patient advised ER for evaluation to rule out clot and determine cause of pain.     Patient agreeable and will go to [place] [facility] now for eval.       Reason for call: Leg Pain  Onset: leg pain today, foot pain post injury in same leg over the weekend       Reason for Disposition   Thigh or calf pain in only one leg and present > 1 hour    Protocols used: Leg Pain-A-[place]

## 2024-10-16 NOTE — ED PROVIDER NOTES
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)    Diabetes (HCC)    High cholesterol    Hyperlipidemia              Past Surgical History:   Procedure Laterality Date    Back surgery      epidural abscess of spinal cord    Colonoscopy      Had one done in [place] 5 years    Electrocardiogram, complete      Scanned to media tab - DOS 09-                Social History     Socioeconomic History    Marital status:    Tobacco Use    Smoking status: Never    Smokeless tobacco: Never   Vaping Use    Vaping status: Never Used   Substance and Sexual Activity    Alcohol use: Not Currently    Drug use: No    Sexual activity: Yes   Other Topics Concern    Caffeine Concern Yes     Comment: Coffee 6 cups daily                  Physical Exam     ED Triage Vitals [10/16/24 1227]   BP (!) 183/80   Pulse 76   Resp 20   Temp 98.3 °F (36.8 °C)   Temp src Temporal   SpO2 98 %   O2 Device None (Room air)       Current Vitals:   Vital Signs  BP: 147/66  Pulse: 76  Resp: 18  Temp: 98.3 °F (36.8 °C)  Temp src: Temporal    Oxygen Therapy  SpO2: 97 %  O2 Device: None (Room air)        Physical Exam        ED Course   Labs Reviewed - No data to display                MDM      69-year-old male with history of type 2 diabetes presents today for right lower extremity pain.  Patient states that on Saturday, tree branch landed on his right foot.  Since then, he has had some pain and bruising in the area.  He also reports that he has had pain in his anterior right shin that is made worse by standing and walking.  He has tried some Advil and a pain cream with limited relief.  Denies lower extremity swelling, popliteal pain, chest pain, shortness of breath, or other symptoms.  Patient denies  recent surgery, history of DVT/PE, recent immobility, recent extended flight/car rides.    On exam, somewhat hypertensive with otherwise normal vitals, well-appearing, ecchymosis at the base of the 2nd through 4th digits on the right foot with tenderness to palpation but without gross deformity.  Mild tenderness to palpation on the anterolateral shin without deformity or swelling.  No popliteal swelling or tenderness or tenderness along the deep venous system.    Differential: Fracture of the right foot, shinsplints, claudication, tendinopathy.  Overall low concern for DVT.        Medical Decision Making      Disposition and Plan     Clinical Impression:  1. Right foot pain    2. Pain in right shin         Disposition:  There is no disposition on file for this visit.  There is no disposition time on file for this visit.    Follow-up:  No follow-up provider specified.  We recommend that you schedule follow up care with a primary care provider within the next three months to obtain basic health screening including reassessment of your blood pressure.      Medications Prescribed:  Current Discharge Medication List              Supplementary Documentation:

## 2024-10-21 ENCOUNTER — PATIENT OUTREACH (OUTPATIENT)
Dept: CASE MANAGEMENT | Age: 69
End: 2024-10-21

## 2024-10-21 NOTE — PROGRESS NOTES
Patient had recent Emergency Room visit, calling to offer Primary Care Physician follow-up appointment (discharged 10/16)    Dr Brandon Lewis  Internal Medicine   [address] [place] [address] [phone]   Patient declined appointment; patient is seeing a Podiatrist  Closing encounter

## 2024-10-25 ENCOUNTER — TELEPHONE (OUTPATIENT)
Facility: CLINIC | Age: 69
End: 2024-10-25

## 2024-10-25 DIAGNOSIS — Z01.89 ENCOUNTER FOR LOWER EXTREMITY COMPARISON IMAGING STUDY: ICD-10-CM

## 2024-10-25 DIAGNOSIS — M25.561 RIGHT KNEE PAIN, UNSPECIFIED CHRONICITY: Primary | ICD-10-CM

## 2024-10-25 NOTE — TELEPHONE ENCOUNTER
New patient is scheduled for right knee pain.    Future Appointments   Date Time Provider Department Center   11/4/2024 10:40 AM Lauren Howard PA-C [facility]     Please advise if new imaging is needed.

## 2024-10-31 ENCOUNTER — HOSPITAL ENCOUNTER (OUTPATIENT)
Dept: GENERAL RADIOLOGY | Age: 69
Discharge: HOME | End: 2024-10-31
Attending: PHYSICIAN ASSISTANT
Payer: MEDICARE

## 2024-10-31 ENCOUNTER — OFFICE VISIT (OUTPATIENT)
Dept: ORTHOPEDICS CLINIC | Facility: CLINIC | Age: 69
End: 2024-10-31
Payer: COMMERCIAL

## 2024-10-31 VITALS — WEIGHT: 208 LBS | HEIGHT: 70 IN | BODY MASS INDEX: 29.78 KG/M2

## 2024-10-31 DIAGNOSIS — Z01.89 ENCOUNTER FOR LOWER EXTREMITY COMPARISON IMAGING STUDY: ICD-10-CM

## 2024-10-31 DIAGNOSIS — M54.16 LUMBAR RADICULOPATHY: Primary | ICD-10-CM

## 2024-10-31 DIAGNOSIS — M25.561 RIGHT KNEE PAIN, UNSPECIFIED CHRONICITY: ICD-10-CM

## 2024-10-31 PROCEDURE — 73564 X-RAY EXAM KNEE 4 OR MORE: CPT | Performed by: PHYSICIAN ASSISTANT

## 2024-10-31 PROCEDURE — 1125F AMNT PAIN NOTED PAIN PRSNT: CPT | Performed by: PHYSICIAN ASSISTANT

## 2024-10-31 PROCEDURE — 1160F RVW MEDS BY RX/DR IN RCRD: CPT | Performed by: PHYSICIAN ASSISTANT

## 2024-10-31 PROCEDURE — 99204 OFFICE O/P NEW MOD 45 MIN: CPT | Performed by: PHYSICIAN ASSISTANT

## 2024-10-31 PROCEDURE — 3008F BODY MASS INDEX DOCD: CPT | Performed by: PHYSICIAN ASSISTANT

## 2024-10-31 PROCEDURE — 1159F MED LIST DOCD IN RCRD: CPT | Performed by: PHYSICIAN ASSISTANT

## 2024-10-31 PROCEDURE — 73562 X-RAY EXAM OF KNEE 3: CPT | Performed by: PHYSICIAN ASSISTANT

## 2024-10-31 RX ORDER — METHYLPREDNISOLONE 4 MG/1
TABLET ORAL
Qty: 1 EACH | Refills: 0 | Status: SHIPPED | OUTPATIENT
Start: 2024-10-31 | End: 2024-10-31 | Stop reason: CLARIF

## 2024-10-31 RX ORDER — METHYLPREDNISOLONE 4 MG/1
TABLET ORAL
Qty: 1 EACH | Refills: 0 | Status: SHIPPED | OUTPATIENT
Start: 2024-10-31

## 2024-10-31 NOTE — PROGRESS NOTES
[facility] New Patient Note    CC: Right leg pain    HPI: This 69 year old male presents today with complaints of right leg pain.  Onset of symptoms started approximately 2 weeks ago when he was cutting down a branch.  He states that the branch landed on his foot and he had severe pain.  He was seen and evaluated at the emergency department at which time x-rays of the tib-fib and foot were completed.  He was advised to follow-up with orthopedics.  Currently, pain is localized down the right leg.  He notes occasional back pain with radiation down the front of the leg into the shin and into the foot.  He denies numbness, tingling or weakness.  He states that he is very active and may relate worsening symptoms to picking up leaves yesterday.  For treatment he has tried ibuprofen and Tylenol with no resolution in symptoms.  He is here for further evaluation.    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 30 tablet 0    glimepiride 2 MG Oral Tab Take 1 tablet (2 mg total) by mouth daily with breakfast. 90 tablet 3    atorvastatin 10 MG Oral Tab Take 1 tablet (10 mg total) by mouth every evening. 100 tablet 2    metFORMIN 500 MG Oral Tab Take 2 tablets (1,000 mg total) by mouth 2 (two) times daily with meals. 360 tablet 3    Fenofibrate 134 MG Oral Cap Take 1 capsule by mouth daily. 90 capsule 3    vitamin E 600 UNITS Oral Cap Take 1 capsule (600 Units total) by mouth daily.      Multiple Vitamins-Minerals (MULTI-VITAMIN/MINERALS) Oral Tab Take 1 tablet by mouth daily.       [employer] LANCETS ULTRATHIN 30G Does not apply Misc Test by Intradermal route 2 times every day 100 each 11    aspirin 81 MG Oral Chew Tab Chew 1 tablet (81 mg total) by mouth daily.      Glucosamine-Chondroitin 750-600 MG Oral Tab Take 1 tablet by mouth daily.       Allergies[1]  Family History   Problem Relation Age of Onset    Lung Disorder Mother         smoker 2ppd x 40 yrs    Heart Disease Father         MI    Heart Disorder Father         CABG x 4/AAA repair    Heart Surgery Father     Diabetes Sister         borderline    Cancer Sister         non hodgkins lymphoma    Psychiatric Sister         Bippolar    Other (recovering ETOH) Brother     Other (ETOH homeless) Brother     Glaucoma Neg     Macular degeneration Neg      Social History     Occupational History    Not on file   Tobacco Use    Smoking status: Never    Smokeless tobacco: Never   Vaping Use    Vaping status: Never Used   Substance and Sexual Activity    Alcohol use: Not Currently    Drug use: No    Sexual activity: Yes        ROS:  Complete review of symptoms was reviewed and is non-contributory to the chief complaint as mentioned above.      Physical Exam: He is a pleasant 69-year-old male in no acute distress.  Ambulates with a nonantalgic gait.  Exam of the right knee and lower extremity reveals that he has mild patellofemoral crepitus with range of motion.  No medial or lateral joint line tenderness.  No effusion.  He has negative straight leg raise.  No pain with flexion extension of the lumbar spine.  He has 5 out of 5 motor strength testing with resisted hip flexion, knee flexion extension ankle dorsiflexion and plantarflexion.  Sensation is present to light touch.        Imaging: I personally viewed, independently interpreted and radiology report read. They show:  XR KNEE (3 VIEWS), LEFT (CPT=73562)    Result Date: 10/31/2024  CONCLUSION:  1. Degenerative change.   LOCATION:  [facility]   Dictated by (CST): Erin Wild MD on 10/31/2024 at  10:51 AM     Finalized by (CST): Erin Wild MD on 10/31/2024 at 10:53 AM       XR KNEE, COMPLETE (4 OR MORE VIEWS), RIGHT (CPT=73564)    Result Date: 10/31/2024  CONCLUSION:  1. Degenerative change.   LOCATION:  [facility]   Dictated by (CST): Erin Wild MD on 10/31/2024 at 10:29 AM     Finalized by (CST): Erin Wild MD on 10/31/2024 at 10:30 AM              Assessment: Lumbar radiculopathy      Plan: I reviewed right knee x-rays show some mild degenerative changes however I do not believe the knee pain is causing his right leg pain.  I explained that he may be experiencing some lumbar radiculopathy and I recommend continued conservative treatment including a Medrol Dosepak and formal physical therapy.  This should improve symptoms with time and if pain persists or worsen, further evaluation with dedicated x-rays of the lumbar spine and possible MRI scan may be considered next.  He will continue with Tylenol also as needed but will discontinue all other anti-inflammatories while on the Medrol Dosepak.  He will follow-up with me on an as-needed basis with recurrent or worsening symptoms, questions or concerns.        Lauren Howard PA-C  Orthopedic Surgery   [address] [place] [id number]   t: [phone]  f: [phone]           This document was partially prepared using Dragon Medical voice recognition software.  Although every attempt is made to correct errors during dictation, discrepancies may still exist. Please contact me with any questions or clarifications.         [1]   Allergies  Allergen Reactions    Penicillins      Other reaction(s): Hives

## 2024-11-28 RX ORDER — FENOFIBRATE 134 MG/1
134 CAPSULE ORAL DAILY
Qty: 100 CAPSULE | Refills: 2 | OUTPATIENT
Start: 2024-11-28

## 2024-12-09 ENCOUNTER — OFFICE VISIT (OUTPATIENT)
Dept: ENDOCRINOLOGY CLINIC | Facility: CLINIC | Age: 69
End: 2024-12-09
Payer: COMMERCIAL

## 2024-12-09 VITALS
HEART RATE: 70 BPM | SYSTOLIC BLOOD PRESSURE: 130 MMHG | DIASTOLIC BLOOD PRESSURE: 76 MMHG | BODY MASS INDEX: 29.63 KG/M2 | WEIGHT: 207 LBS | HEIGHT: 70 IN

## 2024-12-09 DIAGNOSIS — E11.69 TYPE 2 DIABETES MELLITUS WITH OTHER SPECIFIED COMPLICATION, UNSPECIFIED WHETHER LONG TERM INSULIN USE (HCC): Primary | ICD-10-CM

## 2024-12-09 DIAGNOSIS — E78.5 DYSLIPIDEMIA: ICD-10-CM

## 2024-12-09 LAB
GLUCOSE BLOOD: 169
HEMOGLOBIN A1C: 5.7 % (ref 4.3–5.6)
TEST STRIP LOT #: NORMAL NUMERIC

## 2024-12-09 PROCEDURE — 3075F SYST BP GE 130 - 139MM HG: CPT | Performed by: INTERNAL MEDICINE

## 2024-12-09 PROCEDURE — 3044F HG A1C LEVEL LT 7.0%: CPT | Performed by: INTERNAL MEDICINE

## 2024-12-09 PROCEDURE — 3078F DIAST BP <80 MM HG: CPT | Performed by: INTERNAL MEDICINE

## 2024-12-09 PROCEDURE — 82947 ASSAY GLUCOSE BLOOD QUANT: CPT | Performed by: INTERNAL MEDICINE

## 2024-12-09 PROCEDURE — 1159F MED LIST DOCD IN RCRD: CPT | Performed by: INTERNAL MEDICINE

## 2024-12-09 PROCEDURE — 3008F BODY MASS INDEX DOCD: CPT | Performed by: INTERNAL MEDICINE

## 2024-12-09 PROCEDURE — 1160F RVW MEDS BY RX/DR IN RCRD: CPT | Performed by: INTERNAL MEDICINE

## 2024-12-09 PROCEDURE — 83036 HEMOGLOBIN GLYCOSYLATED A1C: CPT | Performed by: INTERNAL MEDICINE

## 2024-12-09 PROCEDURE — 99213 OFFICE O/P EST LOW 20 MIN: CPT | Performed by: INTERNAL MEDICINE

## 2024-12-09 NOTE — PROGRESS NOTES
Return Office Visit     CHIEF COMPLAINT:    DM   Dyslipidemia    HISTORY OF PRESENT ILLNESS:  Nacho Will is a 69 year old male who presents for follow up for DM.      DM HISTORY  Diagnosed: around age  59     HISTORY OF DIABETES COMPLICATIONS: :  History of Retinopathy:denies, CRVO Left  last eye exam:  2024  History of Neuropathy: yes.   History of Nephropathy: no      ASSOCIATED COMPLICATIONS:   HTN: denies  Hyperlipidemia: yes  Coronary Artery Disease:  denies  Cerebrovascular Disease: denies        HOME GLUCOSE READINGS:   Checks a few times a week, alternates timings  Fastin-150  Bedtime: average around 150    No low BG under 70          CURRENT DIABETIC MEDICATIONS INCLUDE:  MTF  1000 Two tabs BID  Amaryl 2 mg BF and 1 mg dinner     MEALS:  3-4 meals a day  Dietary compliance with a low carb diet has been moderate    EXERCISE:  No regular exercise, but is very active         CURRENT MEDICATION:    Current Outpatient Medications   Medication Sig Dispense Refill    glimepiride 2 MG Oral Tab Take 1 tablet (2 mg total) by mouth daily with breakfast. 90 tablet 3    metFORMIN 500 MG Oral Tab Take 2 tablets (1,000 mg total) by mouth 2 (two) times daily with meals. 360 tablet 3    methylPREDNISolone (MEDROL) 4 MG Oral Tablet Therapy Pack As directed. 1 each 0    senna-docusate 8.6-50 MG Oral Tab Take 1 tablet by mouth daily. 30 tablet 0    atorvastatin 10 MG Oral Tab Take 1 tablet (10 mg total) by mouth every evening. 100 tablet 2    Fenofibrate 134 MG Oral Cap Take 1 capsule by mouth daily. 90 capsule 3    vitamin E 600 UNITS Oral Cap Take 1 capsule (600 Units total) by mouth daily.      Multiple Vitamins-Minerals (MULTI-VITAMIN/MINERALS) Oral Tab Take 1 tablet by mouth daily.      [employer] LANCETS ULTRATHIN 30G Does not apply Misc Test by Intradermal route 2 times every day 100 each 11    aspirin 81 MG Oral Chew Tab Chew 1 tablet (81 mg total) by mouth daily.      Glucosamine-Chondroitin 750-600 MG  Oral Tab Take 1 tablet by mouth daily.           ALLERGY:  Allergies   Allergen Reactions    Penicillins      Other reaction(s): Hives       PAST MEDICAL, SOCIAL AND FAMILY HISTORY:  See past medical history marked as reviewed.  See past surgical history marked as reviewed.  See past family history marked as reviewed.  See past social history marked as reviewed.    ASSESSMENTS:     REVIEW OF SYSTEMS:  Constitutional: Negative for: fever, fatigue, cold/heat intolerance, + weight loss intentional   Eyes: Negative for:  Visual changes, proptosis, blurring  ENT: Negative for:  dysphagia, neck swelling, dysphonia  Respiratory: Negative for:  dyspnea, cough  Cardiovascular: Negative for:  chest pain, palpitations, orthopnea  GI: Negative for:  abdominal pain, nausea, vomiting, diarrhea, constipation, bleeding  Neurology: Negative for: headache, numbness, weakness  Genito-Urinary: Negative for: dysuria, frequency  Psychiatric: Negative for:  depression, anxiety  Hematology/Lymphatics: Negative for: bruising, lower extremity edema  Endocrine: Negative for: polyuria, polydypsia  Skin: Negative for: rash, blister, cellulitis,       PHYSICAL EXAM:   Vitals:    12/09/24 0855 12/09/24 0929   BP: 157/79 130/76   Pulse: 70    Weight: 207 lb (93.9 kg)    Height: 5' 10\" (1.778 m)      BMI: Body mass index is 29.7 kg/m².         General Appearance:  alert, well developed, in no acute distress  Head: Atraumatic  Eyes:  normal conjunctivae, sclera., normal sclera and normal pupils  Throat/Neck: normal sound to voice. Normal hearing, normal speech  Respiratory:  Speaking in full sentences, non-labored. no increased work of breathing, no audible wheezing    Neuro: motor grossly intact, moving all extremities without difficulty  Psychiatric:  oriented to time, self, and place  Extremities:  12/2024  Bilateral barefoot skin diabetic exam is normal, visualized feet and the appearance is normal.  Bilateral monofilament/sensation of both feet  is normal.  Pulsation pedal pulse exam of both lower legs/feet is normal as well.          DATA:         Pertinent data reviewed  a1c  5.7 % ( 12/2024)     ASSESSMENT AND PLAN:     1. Type 2 DM:     Plan:  Discussed the pathogenesis, natural course of diabetes. Patient understands the importance of glycemic control and the implications of uncontrolled diabetes including Diabetic ketoacidosis and various micro vascular and macrovascular complications.           a). Medications:        Metformin 1000 mg BID  Take with food  GI SE        Amaryl  2 mg BF only   Counselled regarding risk of hypoglycemia associated with use.      I reviewed GLP agonists and SGLT 2 inhibitors, however these are tier 3 and he states he has enquired about the cost and they are extensive    To call if he has any low Bg under 80     b). No h/o Nephropathy :  Ur MA was slightly high in the past , normal on last check. Continued good BG and BP control  c). Discussed importance of annual eye exams  d). Foot exam: Daily feet exam explained  e). BG log maintainence explained in great detail, to get log and glucometer on next visit.  f). Life style changes reviewed  g). Hypoglycemia recognition and management discussed     2. Patient’s BP is slightly high today  Low salt diet, monitor BP at home  States he was started on lisinopril but is not on it any more, states he was concerned about SE  I offered prescribing another medication , patient declined , states he will like to discuss with Dr. Lewis who is seeing next week   Reports he had cardiac JONES which was okay  Understands implications of high BP including CAD and CVA  Will continue to address  3. Dyslipidemia  A) Discussed lifestyle modifications including reductions in dietary total and saturated fat, weight loss, aerobic exercise, and eating a diet rich in fruits and vegetables.  B) Statin therapy  Discussed the potential side effects of statins including muscle and liver injury.  TG high:  low fat diet, c/w fenofibrate no SE  Fasting lipid panel reviewed  4. High TSH , normal Free T4  Clinically no weight gain, constipation, cold intolerance, fatigue  TSh normal in Aug 2024  Will monitor  To call if he develops symptoms as above     RTC in 5 months  Bib with BG as discussed       Orders Placed This Encounter   Procedures    POC HemoCue Glucose 201 (Finger stick glucose)    POC Glycohemoglobin [[id number]]           Kiersten Cortez MD

## 2025-01-07 ENCOUNTER — TELEPHONE (OUTPATIENT)
Dept: INTERNAL MEDICINE CLINIC | Facility: CLINIC | Age: 70
End: 2025-01-07

## 2025-01-07 DIAGNOSIS — E11.9 TYPE 2 DIABETES MELLITUS WITHOUT COMPLICATION, WITHOUT LONG-TERM CURRENT USE OF INSULIN (HCC): ICD-10-CM

## 2025-01-07 NOTE — TELEPHONE ENCOUNTER
We can send to local pharmacy - but which local? The only pharmacy on chart is [facility] ([facility]).    Year supply of refills good until 3/2025 at mailorder.  Outpatient Medication Detail     Disp Refills Start End    metFORMIN 500 MG Oral Tab 360 tablet 3 2/29/2024 --    Sig - Route: Take 2 tablets (1,000 mg total) by mouth 2 (two) times daily with meals. - Oral    Sent to pharmacy as: metFORMIN HCl 500 MG Oral Tablet (Glucophage)    E-Prescribing Status: Receipt confirmed by pharmacy (2/29/2024 10:28 AM CST)    Associated Diagnoses  Type 2 diabetes mellitus without complication, without long-term current use of insulin ([facility])      Pharmacy  [facility] - [address] [phone], [phone]

## 2025-01-07 NOTE — TELEPHONE ENCOUNTER
Patient called requesting a refill on the following medication:      metFORMIN 500 MG Oral Tab       Per patient he is completely out of medication and wants the refill to go to this local pharmacy.    [facility]     [address]  [place]

## 2025-01-07 NOTE — TELEPHONE ENCOUNTER
Refill passed per [facility] protocols.  Per patient he is completely out of medication and wants the refill to go to this local pharmacy.     [facility]      [address]  [place]   Requested Prescriptions   Pending Prescriptions Disp Refills    metFORMIN 500 MG Oral Tab 360 tablet 3     Sig: Take 2 tablets (1,000 mg total) by mouth 2 (two) times daily with meals.       Diabetes Medication Protocol Passed - 1/7/2025 12:46 PM        Passed - Last A1C < 7.5 and within past 6 months     Lab Results   Component Value Date    A1C 5.7 (A) 12/09/2024             Passed - In person appointment or virtual visit in the past 6 mos or appointment in next 3 mos     Recent Outpatient Visits              4 weeks ago Type 2 diabetes mellitus with other specified complication, unspecified whether long term insulin use (HCC)    [facility] Kiersten Cortez MD    Office Visit    2 months ago Lumbar radiculopathy    [facility], [address] [place] [address] Lauren Howard PA-C    Office Visit    4 months ago Diabetes mellitus type 2 without retinopathy ([facility])    [facility] Brandon Lewis MD    Office Visit    6 months ago Dyslipidemia    [facility] Kiersten Cortez MD    Office Visit    11 months ago Diabetes mellitus type 2 without retinopathy ([facility])    [facility] Brandon Lewis MD    Office Visit          Future Appointments         Provider Department Appt Notes    In 1 month Brandon Lewis MD [facility] 6 month    In 4 months Kiersten Cortez MD [facility] 5 months                    Passed - Microalbumin procedure in past 12 months or taking ACE/ARB        Passed - EGFRCR or GFRNAA > 50     GFR  Evaluation  EGFRCR: 71 , resulted on 8/3/2024          Passed - GFR in the past 12 months

## 2025-01-07 NOTE — TELEPHONE ENCOUNTER
Added to message below. Please sent to the Sid on file.    Sid     [address]  [place]

## 2025-01-10 RX ORDER — FENOFIBRATE 134 MG/1
134 CAPSULE ORAL DAILY
Qty: 100 CAPSULE | Refills: 2 | Status: SHIPPED | OUTPATIENT
Start: 2025-01-10

## 2025-01-10 NOTE — TELEPHONE ENCOUNTER
Refill Per Protocol     Requested Prescriptions   Pending Prescriptions Disp Refills    FENOFIBRATE MICRONIZED 134 MG Oral Cap [Pharmacy Med Name: FENOFIBRATE  134MG  CAP] 100 capsule 2     Sig: TAKE 1 CAPSULE BY MOUTH DAILY       Cholesterol Medication Protocol Passed - 1/10/2025 11:50 AM        Passed - ALT < 80     Lab Results   Component Value Date    ALT 24 08/03/2024             Passed - ALT resulted within past year        Passed - Lipid panel within past 12 months     Lab Results   Component Value Date    CHOLEST 111 08/03/2024    TRIG 161 (H) 08/03/2024    HDL 35 (L) 08/03/2024    LDL 49 08/03/2024    VLDL 23 08/03/2024    NONHDLC 76 08/03/2024             Passed - In person appointment or virtual visit in the past 12 mos or appointment in next 3 mos     Recent Outpatient Visits              1 month ago Type 2 diabetes mellitus with other specified complication, unspecified whether long term insulin use (HCC)    [facility] Kiersten Cortez MD    Office Visit    2 months ago Lumbar radiculopathy    [facility], [address] [place] [address] Lauren Howard PA-C    Office Visit    4 months ago Diabetes mellitus type 2 without retinopathy (HCC)    [facility] Brandon Lewis MD    Office Visit    6 months ago Dyslipidemia    [facility] Kiersten Cortez MD    Office Visit    12 months ago Diabetes mellitus type 2 without retinopathy (HCC)    [facility] Brandon Lewis MD    Office Visit          Future Appointments         Provider Department Appt Notes    In 1 month Brandon Lewis MD [facility] 6 month    In 4 months Kiersten Cortez MD [facility] 5 months                    Passed - Medication is active on med list                Future Appointments         Provider Department Appt Notes    In 1 month Brandon Lewis MD [facility] 6 month    In 4 months Kiersten Cortez MD [facility] 5 months          Recent Outpatient Visits              1 month ago Type 2 diabetes mellitus with other specified complication, unspecified whether long term insulin use (HCC)    [facility] Kiersten Cortez MD    Office Visit    2 months ago Lumbar radiculopathy    [facility], [address] [place] [address] Lauren Howard PA-C    Office Visit    4 months ago Diabetes mellitus type 2 without retinopathy (HCC)    [facility] Brandon Lewis MD    Office Visit    6 months ago Dyslipidemia    [facility] Kiersten Cortez MD    Office Visit    12 months ago Diabetes mellitus type 2 without retinopathy (HCC)    [facility] Brandon Lewis MD    Office Visit

## 2025-01-10 NOTE — TELEPHONE ENCOUNTER
Patient leaving out of state Monday, Jan 13 and requesting a 10 day supply prescription to [facility].     [id number] - [place], [place] - [address]  AT [facility], [phone], [phone] [[id number]]

## 2025-03-03 ENCOUNTER — OFFICE VISIT (OUTPATIENT)
Dept: INTERNAL MEDICINE CLINIC | Facility: CLINIC | Age: 70
End: 2025-03-03
Payer: COMMERCIAL

## 2025-03-03 VITALS
WEIGHT: 211 LBS | HEIGHT: 70 IN | BODY MASS INDEX: 30.21 KG/M2 | HEART RATE: 73 BPM | OXYGEN SATURATION: 97 % | DIASTOLIC BLOOD PRESSURE: 80 MMHG | SYSTOLIC BLOOD PRESSURE: 146 MMHG

## 2025-03-03 DIAGNOSIS — E11.69 TYPE 2 DIABETES MELLITUS WITH OTHER SPECIFIED COMPLICATION, UNSPECIFIED WHETHER LONG TERM INSULIN USE (HCC): ICD-10-CM

## 2025-03-03 DIAGNOSIS — I10 PRIMARY HYPERTENSION: ICD-10-CM

## 2025-03-03 DIAGNOSIS — H34.8120 CENTRAL RETINAL VEIN OCCLUSION WITH MACULAR EDEMA OF LEFT EYE (HCC): ICD-10-CM

## 2025-03-03 DIAGNOSIS — Z12.5 SCREENING PSA (PROSTATE SPECIFIC ANTIGEN): ICD-10-CM

## 2025-03-03 DIAGNOSIS — L98.9 SKIN LESION: ICD-10-CM

## 2025-03-03 DIAGNOSIS — Z12.11 SCREEN FOR COLON CANCER: ICD-10-CM

## 2025-03-03 DIAGNOSIS — E78.00 PURE HYPERCHOLESTEROLEMIA: Primary | ICD-10-CM

## 2025-03-03 PROCEDURE — 1159F MED LIST DOCD IN RCRD: CPT | Performed by: INTERNAL MEDICINE

## 2025-03-03 PROCEDURE — 1126F AMNT PAIN NOTED NONE PRSNT: CPT | Performed by: INTERNAL MEDICINE

## 2025-03-03 PROCEDURE — 3008F BODY MASS INDEX DOCD: CPT | Performed by: INTERNAL MEDICINE

## 2025-03-03 PROCEDURE — G2211 COMPLEX E/M VISIT ADD ON: HCPCS | Performed by: INTERNAL MEDICINE

## 2025-03-03 PROCEDURE — 3079F DIAST BP 80-89 MM HG: CPT | Performed by: INTERNAL MEDICINE

## 2025-03-03 PROCEDURE — 3077F SYST BP >= 140 MM HG: CPT | Performed by: INTERNAL MEDICINE

## 2025-03-03 PROCEDURE — 99214 OFFICE O/P EST MOD 30 MIN: CPT | Performed by: INTERNAL MEDICINE

## 2025-03-03 RX ORDER — TURMERIC 400 MG
CAPSULE ORAL
COMMUNITY

## 2025-03-03 NOTE — PROGRESS NOTES
Nacho Will is a 69 year old male.  Chief Complaint   Patient presents with    Follow - Up     6 month follow up, would like to get his back checked, and has been having chest pains     HPI:     History of Present Illness     History of Present Illness  The patient, with a history of diabetes and hyperlipidemia, presents for a six-month follow-up. His last HbA1c in December was 5.7, indicating good control of his diabetes. He is currently on metformin and glimepiride for diabetes management. He also takes atorvastatin for cholesterol control. The patient has been experiencing occasional chest pain, described as a 'zinger,' typically when relaxed and not during exertion. He has a history of a normal stress test about a year and a half ago.    The patient also reports a lump on his head, which appears to be a small skin cyst. He has noticed a lump in his abdomen, which could be a ventral hernia or rectus diastasis. He has no pain associated with this lump. He has a history of a broken toe, which occasionally causes a throbbing sensation. He also has a history of vision loss in the left eye due to an occluded vein. He is scheduled to see an optometrist for a diabetic eye exam.     Current Outpatient Medications   Medication Sig Dispense Refill    Turmeric 400 MG Oral Cap Take by mouth.      PATIENT SUPPLIED MEDICATION Total Beets      fenofibrate micronized 134 MG Oral Cap Take 1 capsule (134 mg total) by mouth daily. 100 capsule 2    metFORMIN 500 MG Oral Tab Take 2 tablets (1,000 mg total) by mouth 2 (two) times daily with meals. 360 tablet 3    glimepiride 2 MG Oral Tab Take 1 tablet (2 mg total) by mouth daily with breakfast. 90 tablet 3    atorvastatin 10 MG Oral Tab Take 1 tablet (10 mg total) by mouth every evening. 100 tablet 2    vitamin E 600 UNITS Oral Cap Take 1 capsule (600 Units total) by mouth daily.      Multiple Vitamins-Minerals (MULTI-VITAMIN/MINERALS) Oral Tab Take 1 tablet by mouth daily.       [employer] LANCETS ULTRATHIN 30G Does not apply Misc Test by Intradermal route 2 times every day 100 each 11    aspirin 81 MG Oral Chew Tab Chew 1 tablet (81 mg total) by mouth daily.      Glucosamine-Chondroitin 750-600 MG Oral Tab Take 1 tablet by mouth daily.        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)    Diabetes (HCC)    High cholesterol    Hyperlipidemia      Past Surgical History:   Procedure Laterality Date    Back surgery      epidural abscess of spinal cord    Colonoscopy      Had one done in [place] 5 years    Electrocardiogram, complete      Scanned to media tab - DOS 09-      Social History:  Social History     Socioeconomic History    Marital status:    Tobacco Use    Smoking status: Never    Smokeless tobacco: Never   Vaping Use    Vaping status: Never Used   Substance and Sexual Activity    Alcohol use: Not Currently    Drug use: No    Sexual activity: Yes   Other Topics Concern    Caffeine Concern Yes     Comment: Coffee 6 cups daily      Family History   Problem Relation Age of Onset    Lung Disorder Mother         smoker 2ppd x 40 yrs    Heart Disease Father         MI    Heart Disorder Father         CABG x 4/AAA repair    Heart Surgery Father     Diabetes Sister         borderline    Cancer Sister         non hodgkins lymphoma    Psychiatric Sister         Bippolar    Other (recovering ETOH) Brother     Other (ETOH homeless) Brother     Glaucoma Neg     Macular degeneration Neg       Allergies[1]     REVIEW OF SYSTEMS:   Review of Systems   Review of Systems   Constitutional: Negative for activity change, appetite change and fever.   HENT: Negative for congestion and voice change.    Respiratory: Negative for cough and shortness of breath.    Cardiovascular: Negative for chest pain.   Gastrointestinal: Negative for abdominal distention, abdominal pain and vomiting.   Genitourinary: Negative for hematuria.   Skin: Negative for wound.    Psychiatric/Behavioral: Negative for behavioral problems.   Wt Readings from Last 5 Encounters:   03/03/25 211 lb (95.7 kg)   12/09/24 207 lb (93.9 kg)   10/31/24 208 lb (94.3 kg)   10/16/24 208 lb (94.3 kg)   08/26/24 205 lb 3.2 oz (93.1 kg)     Body mass index is 30.28 kg/m².      EXAM:   /84   Pulse 73   Ht 5' 10\" (1.778 m)   Wt 211 lb (95.7 kg)   SpO2 97%   BMI 30.28 kg/m²   Physical Exam   Constitutional:       Appearance: Normal appearance.   HENT:      Head: Normocephalic.   Eyes:      Conjunctiva/sclera: Conjunctivae normal.   Cardiovascular:      Rate and Rhythm: Normal rate and regular rhythm.      Heart sounds: Normal heart sounds. No murmur heard.  Pulmonary:      Effort: Pulmonary effort is normal.      Breath sounds: Normal breath sounds. No rhonchi or rales.   Abdominal:      General: Bowel sounds are normal.      Palpations: Abdomen is soft.      Tenderness: There is no abdominal tenderness.   Musculoskeletal:      Cervical back: Neck supple.      Right lower leg: No edema.      Left lower leg: No edema.   Skin:     General: Skin is warm and dry.   Neurological:      General: No focal deficit present.      Mental Status: He is alert and oriented to person, place, and time. Mental status is at baseline.   Psychiatric:         Mood and Affect: Mood normal.         Behavior: Behavior normal.   Bilateral barefoot skin diabetic exam is normal, visualized feet and the appearance is normal.  Bilateral monofilament/sensation of both feet is normal.  Pulsation pedal pulse exam of both lower legs/feet is normal as well.  Onychomycosis present.  Ventral hernia present    ASSESSMENT AND PLAN:   1. Pure hypercholesterolemia      2. Primary hypertension  Recheck is high , will monitor for now , low salt diet   - CBC, Platelet; No Differential; Future  - Comp Metabolic Panel (14); Future  - Hemoglobin A1C; Future  - Lipid Panel; Future  - TSH W Reflex To Free T4; Future  - Microalb/Creat Ratio, Random  Urine; Future    3. Central retinal vein occlusion with macular edema of left eye (HCC)  Stable - cont to follow up with optometrist    4. Type 2 diabetes mellitus with other specified complication, unspecified whether long term insulin use (HCC)    - CBC, Platelet; No Differential; Future  - Comp Metabolic Panel (14); Future  - Hemoglobin A1C; Future  - Lipid Panel; Future  - TSH W Reflex To Free T4; Future  - Microalb/Creat Ratio, Random Urine; Future    5. Screening PSA (prostate specific antigen)  - PSA Total, Screen; Future    7 Skin lesion     8 colon cancer screening     Assessment & Plan     Assessment & Plan  Type 2 Diabetes Mellitus  Well controlled with Metformin and Glimepiride. A1c 5.7 in December 2024. Discussed the possibility of reducing medication if A1c remains under 6.  -Continue Metformin 1000mg BID and Glimepiride 2mg daily.  -Consider discussing with endocrinologist the possibility of reducing medication if A1c remains under 6.    Hyperlipidemia  Well controlled with Atorvastatin. LDL 49 in August 2024.  -Continue Atorvastatin.    Chest Pain  Described as a \"zinger\" occurring during relaxation, not exertion. No history of atrial fibrillation. Stress test normal 1.5 years ago.  -Continue monitoring. No further action required at this time.    Skin Lesions  Raised, dark lesion on back and small skin cyst on head. No signs of melanoma or actinic keratosis.  -Consider dermatology referral for further evaluation and possible removal of skin cyst if it enlarges.    Ventral Hernia  Asymptomatic. No pain or discomfort during bowel movements.  -Continue monitoring. Consider surgical consultation if becomes symptomatic.    Onychomycosis  Noted during physical examination.  -Consider antifungal treatment.    General Health Maintenance  -Repeat full blood panel at patient's convenience.  -Continue annual diabetic eye exams. Next exam scheduled for tomorrow.  -PSA last checked 1/22/2024. Consider repeating  in 2025.   Plan: As above      The patient indicates understanding of these issues and agrees to the plan.  No follow-ups on file.    This note was prepared using Dragon Medical voice recognition dictation software. As a result errors may occur. When identified these errors have been corrected. While every attempt is made to correct errors during dictation discrepancies may still exist.         [1]   Allergies  Allergen Reactions    Penicillins      Other reaction(s): Hives

## 2025-03-12 ENCOUNTER — NURSE ONLY (OUTPATIENT)
Facility: CLINIC | Age: 70
End: 2025-03-12

## 2025-03-12 DIAGNOSIS — Z12.11 SCREENING FOR COLON CANCER: Primary | ICD-10-CM

## 2025-03-12 NOTE — PROGRESS NOTES
Scheduled for:  Colonoscopy 53087  Provider Name:  Dr. De Santiago  Date:  5/20/2025  Location:   [facility]  Sedation:  MAC  Time:  2:00 PM - Patient is aware [facility] will call the day before with arrival time.  Prep:  Golytely  Meds/Allergies Reconciled?:  APN reviewed   Diagnosis with codes:  Colon cancer screening Z12.11  Was patient informed to call insurance with codes (Y/N):  Yes, I confirmed [facility] MEDICARE ADVANTAGE insurance with the patient.   Referral sent?:  Referral was sent at the time of electronic surgical scheduling.   [facility] or [facility] notified?:  I sent an electronic request to Endo Scheduling and received a confirmation today.   Medication Orders:  Hold Metformin and Glimepiride the day before and day of procedure. Hold multivitamins/supplements one week prior to procedure.  Misc Orders:  N/A     Further instructions given by staff:  I discussed the prep instructions with the patient which he verbally understood and is aware that I will send the instructions today.

## 2025-03-12 NOTE — PROGRESS NOTES
1. Schedule colonoscopy with General Pool Endoscopist  Diagnosis: screening for colon cancer  Sedation: MAC  Prep: split dose golytely    2. MEDICATION CHANGES PRIOR TO PROCEDURE    *HOLD metformin and glimepiride day before & day of procedure  7 days BEFORE procedure: *HOLD Iron pills, herbal supplements, multivitamins, or weight loss/diet medications (i.e.Phentermine/Vyvanse) or prasugrel (effient- antiplatelet)  DO NOT TAKE: Any form of alcohol, recreational drugs and any forms of erectile dysfunction medications 24 hours prior to procedure.      3.  bowel prep from pharmacy   You can pick the bowel prep up now and store in a cool, dry place in your home until your scheduled bowel prep start date.    4. Read all bowel prep instructions carefully. Bowel prep instructions can also be found online at:  [email or web address]     5. AVOID seeds, nuts, popcorn, raw fruits and vegetables for 5 days before procedure    6. If you start any NEW medication after your visit today, please notify us. Certain medications (like iron or weight loss medications) will need to be held before the procedure, or the procedure cannot be performed safely.

## 2025-03-12 NOTE — PROGRESS NOTES
[place]- Patient had TCS today, he said he had a colonoscopy 10 years ago in [place] but not sure of the providers name.Can you please provide orders for Colonoscopy.

## 2025-03-12 NOTE — PROGRESS NOTES
Called patient for scheduled telephone colon screen.   Please advise on colonoscopy and bowel prep orders.   Medications, pharmacy, and allergies reviewed.     Age 45-74 y/o:   › MD preference:   › Insurance:  [facility] Medicare  › Last PCP visit: 3/3/25  › Last CBC: 1/22/2024  › Date of positive FIT (if applicable):  › H/W/BMI: 5'10/ 211lb/ 30.28    Special comments/notes:  Recall    Last Procedure, Date, MD:      Last diagnosis: Hx of colon polyps   Recalled for (mth/yrs): 10 years   Sedation used previously:    Last Prep Used:    Quality of Prep:      Telephone Colon Screening Questionnaire Yes No   Are you currently experiencing any GI symptoms [] [x]   If yes, explain:     Rectal bleeding [] [x]   Black stool [] [x]   Dysphagia or food \"feeling stuck\" when eating [] [x]   Intractable vomiting [] [x]   Unexplained weight loss [] [x]   First colonoscopy [] [x]   Family history of colon cancer [] [x]   Any issues with anesthesia [] [x]   If yes, explain:      Any recent complaints related to chest pain &/or shortness of breath [] [x]   Referred to a cardiologist?  [] [x]   If yes, explain:      History of  respiratory issues/oxygen/SULEIMAN/COPD [x] []   CPAP/BiPAP:     History of devices (pacemaker/defibrillator) [] [x]   History of heart attack &/or stroke [] [x]   If yes, in the last 12 months? Stent placement?  [] []     Medication Reconciliation  Yes  No   Anticoagulants (except Aspirin) [] [x]   Diabetic Medication [x] []   Weight loss medication (phentermine/vyvanse/saxsenda/etc) [] [x]   Iron/herbal/multivitamin supplement(s) [x] []   Usage of marijuana, CBD &/or vape product(s) [] [x]

## 2025-03-17 DIAGNOSIS — E11.9 TYPE 2 DIABETES MELLITUS WITHOUT COMPLICATION, WITHOUT LONG-TERM CURRENT USE OF INSULIN (HCC): ICD-10-CM

## 2025-03-19 NOTE — TELEPHONE ENCOUNTER
Refill Per Protocol     Requested Prescriptions   Pending Prescriptions Disp Refills    METFORMIN 500 MG Oral Tab [Pharmacy Med Name: metFORMIN HCl 500 MG Oral Tablet] 400 tablet 2     Sig: TAKE 2 TABLETS BY MOUTH TWICE  DAILY WITH MEALS       Diabetes Medication Protocol Passed - 3/19/2025 10:23 AM        Passed - Last A1C < 7.5 and within past 6 months     Lab Results   Component Value Date    A1C 5.7 (A) 12/09/2024             Passed - In person appointment or virtual visit in the past 6 mos or appointment in next 3 mos     Recent Outpatient Visits              1 week ago Screening for colon cancer    [facility]    Nurse Only    2 weeks ago Pure hypercholesterolemia    [facility] Brandon Lewis MD    Office Visit    3 months ago Type 2 diabetes mellitus with other specified complication, unspecified whether long term insulin use (HCC)    [facility] Kiersten Cortez MD    Office Visit    4 months ago Lumbar radiculopathy    [address] Lauren Howard PA-C    Office Visit    6 months ago Diabetes mellitus type 2 without retinopathy (HCC)    [facility] Brandon Lewis MD    Office Visit          Future Appointments         Provider Department Appt Notes    In 1 month Kiersten Cortez MD [facility] 5 months    In 2 months NIURKA FLORES [facility] CLN w/MAC @ [facility]    In 5 months Brandon Lewis MD [facility], [address], [place] Medicare Annual  last medicare 1/2024                    Passed - Microalbumin procedure in past 12 months or taking ACE/ARB        Passed - EGFRCR or GFRNAA > 50     GFR Evaluation  EGFRCR: 71 , resulted on 8/3/2024          Passed - GFR in the  past 12 months        Passed - Medication is active on med list

## 2025-03-20 RX ORDER — GLIMEPIRIDE 2 MG/1
2 TABLET ORAL
Qty: 100 TABLET | Refills: 2 | Status: SHIPPED | OUTPATIENT
Start: 2025-03-20

## 2025-03-20 NOTE — TELEPHONE ENCOUNTER
Refill Per Protocol     Requested Prescriptions   Pending Prescriptions Disp Refills    GLIMEPIRIDE 2 MG Oral Tab [Pharmacy Med Name: Glimepiride 2 MG Oral Tablet] 100 tablet 2     Sig: TAKE 1 TABLET BY MOUTH DAILY  WITH BREAKFAST       Diabetes Medication Protocol Passed - 3/20/2025  6:37 AM        Passed - Last A1C < 7.5 and within past 6 months     Lab Results   Component Value Date    A1C 5.7 (A) 12/09/2024             Passed - In person appointment or virtual visit in the past 6 mos or appointment in next 3 mos     Recent Outpatient Visits              1 week ago [facility]    [facility]    2 weeks ago Pure hypercholesterolemia    [facility] Brandon Lewis MD    Office Visit    3 months ago Type 2 diabetes mellitus with other specified complication, unspecified whether long term insulin use (HCC)    [facility] Kiersten Cortez MD    Office Visit    4 months ago Lumbar radiculopathy    [address] Lauren Howard PA-C    Office Visit    6 months ago Diabetes mellitus type 2 without retinopathy (HCC)    [facility] Brandon Lewis MD    Office Visit          Future Appointments         Provider Department Appt Notes    In 1 month Kiersten Cortez MD [facility] 5 months    In 2 months NIURKA FLORES [facility] w/MAC @ [facility]    In 5 months Brandon Lewis MD [facility], [address], [place] Medicare Annual  last medicare 1/2024                    Passed - Microalbumin procedure in past 12 months or taking ACE/ARB        Passed - EGFRCR or GFRNAA > 50     GFR Evaluation  EGFRCR: 71 , resulted on 8/3/2024          Passed - GFR in the past 12  months        Passed - Medication is active on med list

## 2025-04-01 ENCOUNTER — LAB ENCOUNTER (OUTPATIENT)
Dept: LAB | Age: 70
End: 2025-04-01
Attending: INTERNAL MEDICINE
Payer: MEDICARE

## 2025-04-01 DIAGNOSIS — Z12.5 SCREENING PSA (PROSTATE SPECIFIC ANTIGEN): ICD-10-CM

## 2025-04-01 DIAGNOSIS — I10 PRIMARY HYPERTENSION: ICD-10-CM

## 2025-04-01 DIAGNOSIS — E11.69 TYPE 2 DIABETES MELLITUS WITH OTHER SPECIFIED COMPLICATION, UNSPECIFIED WHETHER LONG TERM INSULIN USE (HCC): ICD-10-CM

## 2025-04-01 LAB
ALBUMIN SERPL-MCNC: 4.2 G/DL (ref 3.2–4.8)
ALBUMIN/GLOB SERPL: 1.4 {RATIO} (ref 1–2)
ALP LIVER SERPL-CCNC: 35 U/L
ALT SERPL-CCNC: 24 U/L
ANION GAP SERPL CALC-SCNC: 6 MMOL/L (ref 0–18)
AST SERPL-CCNC: 26 U/L (ref ?–34)
BILIRUB SERPL-MCNC: 0.5 MG/DL (ref 0.2–1.1)
BUN BLD-MCNC: 15 MG/DL (ref 9–23)
BUN/CREAT SERPL: 13.9 (ref 10–20)
CALCIUM BLD-MCNC: 9.4 MG/DL (ref 8.7–10.4)
CHLORIDE SERPL-SCNC: 105 MMOL/L (ref 98–112)
CHOLEST SERPL-MCNC: 74 MG/DL (ref ?–200)
CO2 SERPL-SCNC: 28 MMOL/L (ref 21–32)
COMPLEXED PSA SERPL-MCNC: 0.91 NG/ML (ref ?–4)
CREAT BLD-MCNC: 1.08 MG/DL
CREAT UR-SCNC: 46.8 MG/DL
DEPRECATED RDW RBC AUTO: 39.9 FL (ref 35.1–46.3)
EGFRCR SERPLBLD CKD-EPI 2021: 74 ML/MIN/1.73M2 (ref 60–?)
ERYTHROCYTE [DISTWIDTH] IN BLOOD BY AUTOMATED COUNT: 13.2 % (ref 11–15)
EST. AVERAGE GLUCOSE BLD GHB EST-MCNC: 134 MG/DL (ref 68–126)
FASTING PATIENT LIPID ANSWER: YES
FASTING STATUS PATIENT QL REPORTED: YES
GLOBULIN PLAS-MCNC: 2.9 G/DL (ref 2–3.5)
GLUCOSE BLD-MCNC: 124 MG/DL (ref 70–99)
HBA1C MFR BLD: 6.3 % (ref ?–5.7)
HCT VFR BLD AUTO: 39.5 %
HDLC SERPL-MCNC: 26 MG/DL (ref 40–59)
HGB BLD-MCNC: 13.2 G/DL
LDLC SERPL CALC-MCNC: 23 MG/DL (ref ?–100)
MCH RBC QN AUTO: 27.6 PG (ref 26–34)
MCHC RBC AUTO-ENTMCNC: 33.4 G/DL (ref 31–37)
MCV RBC AUTO: 82.5 FL
MICROALBUMIN UR-MCNC: 0.4 MG/DL
MICROALBUMIN/CREAT 24H UR-RTO: 8.5 UG/MG (ref ?–30)
NONHDLC SERPL-MCNC: 48 MG/DL (ref ?–130)
OSMOLALITY SERPL CALC.SUM OF ELEC: 290 MOSM/KG (ref 275–295)
PLATELET # BLD AUTO: 176 10(3)UL (ref 150–450)
POTASSIUM SERPL-SCNC: 4.3 MMOL/L (ref 3.5–5.1)
PROT SERPL-MCNC: 7.1 G/DL (ref 5.7–8.2)
RBC # BLD AUTO: 4.79 X10(6)UL
SODIUM SERPL-SCNC: 139 MMOL/L (ref 136–145)
T4 FREE SERPL-MCNC: 1.4 NG/DL (ref 0.8–1.7)
TRIGL SERPL-MCNC: 143 MG/DL (ref 30–149)
TSI SER-ACNC: 9.35 UIU/ML (ref 0.55–4.78)
VLDLC SERPL CALC-MCNC: 19 MG/DL (ref 0–30)
WBC # BLD AUTO: 3.3 X10(3) UL (ref 4–11)

## 2025-04-01 PROCEDURE — 84443 ASSAY THYROID STIM HORMONE: CPT

## 2025-04-01 PROCEDURE — 36415 COLL VENOUS BLD VENIPUNCTURE: CPT

## 2025-04-01 PROCEDURE — 82570 ASSAY OF URINE CREATININE: CPT

## 2025-04-01 PROCEDURE — 83036 HEMOGLOBIN GLYCOSYLATED A1C: CPT

## 2025-04-01 PROCEDURE — 85027 COMPLETE CBC AUTOMATED: CPT

## 2025-04-01 PROCEDURE — 82043 UR ALBUMIN QUANTITATIVE: CPT

## 2025-04-01 PROCEDURE — 80061 LIPID PANEL: CPT

## 2025-04-01 PROCEDURE — 80053 COMPREHEN METABOLIC PANEL: CPT

## 2025-04-01 PROCEDURE — 84439 ASSAY OF FREE THYROXINE: CPT

## 2025-04-04 DIAGNOSIS — D72.819 LEUKOPENIA, UNSPECIFIED TYPE: ICD-10-CM

## 2025-04-04 DIAGNOSIS — R94.6 ABNORMAL THYROID FUNCTION TEST: Primary | ICD-10-CM

## 2025-04-15 DIAGNOSIS — E78.00 PURE HYPERCHOLESTEROLEMIA: ICD-10-CM

## 2025-04-16 ENCOUNTER — TELEPHONE (OUTPATIENT)
Dept: INTERNAL MEDICINE CLINIC | Facility: CLINIC | Age: 70
End: 2025-04-16

## 2025-04-18 RX ORDER — ATORVASTATIN CALCIUM 10 MG/1
10 TABLET, FILM COATED ORAL EVERY EVENING
Qty: 100 TABLET | Refills: 2 | Status: SHIPPED | OUTPATIENT
Start: 2025-04-18

## 2025-04-18 NOTE — TELEPHONE ENCOUNTER
Refill Per Protocol     Requested Prescriptions   Pending Prescriptions Disp Refills    ATORVASTATIN 10 MG Oral Tab [Pharmacy Med Name: Atorvastatin Calcium 10 MG Oral Tablet] 100 tablet 2     Sig: TAKE 1 TABLET BY MOUTH IN THE  EVENING       Cholesterol Medication Protocol Passed - 4/18/2025 11:09 AM        Passed - ALT < 80     Lab Results   Component Value Date    ALT 24 04/01/2025             Passed - ALT resulted within past year        Passed - Lipid panel within past 12 months     Lab Results   Component Value Date    CHOLEST 74 04/01/2025    TRIG 143 04/01/2025    HDL 26 (L) 04/01/2025    LDL 23 04/01/2025    VLDL 19 04/01/2025    NONHDLC 48 04/01/2025             Passed - In person appointment or virtual visit in the past 12 mos or appointment in next 3 mos     Recent Outpatient Visits              1 month ago Screening for colon cancer    [facility]    Nurse Only    1 month ago Pure hypercholesterolemia    [facility] Brandon Lewis MD    Office Visit    4 months ago Type 2 diabetes mellitus with other specified complication, unspecified whether long term insulin use (HCC)    [facility] Kiersten Cortez MD    Office Visit    5 months ago Lumbar radiculopathy    [address] Lauren Howard PA-C    Office Visit    7 months ago Diabetes mellitus type 2 without retinopathy (HCC)    [facility] Brandon Lewis MD    Office Visit          Future Appointments         Provider Department Appt Notes    In 3 weeks Kiersten Cortez MD [facility] 5 months    In 1 month MARK, PROCEDURE [facility] CLN w/MAC @ [facility]    In 4 months Brandon Lewis MD [facility]  Medicare Annual  last medicare 1/2024                    Passed - Medication is active on med list

## 2025-04-23 ENCOUNTER — LAB ENCOUNTER (OUTPATIENT)
Dept: LAB | Age: 70
End: 2025-04-23
Attending: INTERNAL MEDICINE
Payer: MEDICARE

## 2025-04-23 DIAGNOSIS — R94.6 ABNORMAL THYROID FUNCTION TEST: ICD-10-CM

## 2025-04-23 DIAGNOSIS — D72.819 LEUKOPENIA, UNSPECIFIED TYPE: ICD-10-CM

## 2025-04-23 LAB
TSI SER-ACNC: 4.61 UIU/ML (ref 0.55–4.78)
WBC # BLD AUTO: 4.7 X10(3) UL (ref 4–11)

## 2025-04-23 PROCEDURE — 36415 COLL VENOUS BLD VENIPUNCTURE: CPT

## 2025-04-23 PROCEDURE — 84443 ASSAY THYROID STIM HORMONE: CPT

## 2025-04-23 PROCEDURE — 85048 AUTOMATED LEUKOCYTE COUNT: CPT

## 2025-05-12 ENCOUNTER — OFFICE VISIT (OUTPATIENT)
Dept: ENDOCRINOLOGY CLINIC | Facility: CLINIC | Age: 70
End: 2025-05-12
Payer: COMMERCIAL

## 2025-05-12 VITALS
HEART RATE: 68 BPM | DIASTOLIC BLOOD PRESSURE: 70 MMHG | WEIGHT: 207 LBS | BODY MASS INDEX: 29.63 KG/M2 | SYSTOLIC BLOOD PRESSURE: 140 MMHG | HEIGHT: 70 IN

## 2025-05-12 DIAGNOSIS — E11.69 TYPE 2 DIABETES MELLITUS WITH OTHER SPECIFIED COMPLICATION, UNSPECIFIED WHETHER LONG TERM INSULIN USE (HCC): ICD-10-CM

## 2025-05-12 DIAGNOSIS — E78.5 DYSLIPIDEMIA: Primary | ICD-10-CM

## 2025-05-12 DIAGNOSIS — E03.8 SUBCLINICAL HYPOTHYROIDISM: ICD-10-CM

## 2025-05-12 LAB
GLUCOSE BLOOD: 135
TEST STRIP LOT #: NORMAL NUMERIC

## 2025-05-12 PROCEDURE — 99214 OFFICE O/P EST MOD 30 MIN: CPT | Performed by: INTERNAL MEDICINE

## 2025-05-12 PROCEDURE — 1160F RVW MEDS BY RX/DR IN RCRD: CPT | Performed by: INTERNAL MEDICINE

## 2025-05-12 PROCEDURE — 82947 ASSAY GLUCOSE BLOOD QUANT: CPT | Performed by: INTERNAL MEDICINE

## 2025-05-12 PROCEDURE — 1159F MED LIST DOCD IN RCRD: CPT | Performed by: INTERNAL MEDICINE

## 2025-05-12 PROCEDURE — 3061F NEG MICROALBUMINURIA REV: CPT | Performed by: INTERNAL MEDICINE

## 2025-05-12 PROCEDURE — 3077F SYST BP >= 140 MM HG: CPT | Performed by: INTERNAL MEDICINE

## 2025-05-12 PROCEDURE — 3078F DIAST BP <80 MM HG: CPT | Performed by: INTERNAL MEDICINE

## 2025-05-12 PROCEDURE — 3044F HG A1C LEVEL LT 7.0%: CPT | Performed by: INTERNAL MEDICINE

## 2025-05-12 PROCEDURE — 3008F BODY MASS INDEX DOCD: CPT | Performed by: INTERNAL MEDICINE

## 2025-05-12 RX ORDER — GLIMEPIRIDE 1 MG/1
1 TABLET ORAL
COMMUNITY

## 2025-05-12 NOTE — PROGRESS NOTES
Return Office Visit     CHIEF COMPLAINT:    DM   Dyslipidemia    HISTORY OF PRESENT ILLNESS:  Nacho Will is a 69 year old male who presents for follow up for DM.      DM HISTORY  Diagnosed: around age  59     HISTORY OF DIABETES COMPLICATIONS: :  History of Retinopathy:denies, CRVO Left  last eye exam:  2025   History of Neuropathy: yes.   History of Nephropathy: no      ASSOCIATED COMPLICATIONS:   HTN: denies  Hyperlipidemia: yes  Coronary Artery Disease:  denies  Cerebrovascular Disease: denies        HOME GLUCOSE READINGS:   Checks a few times a week, alternates timings  Fastin-150  Bedtime: average around 130    No low BG under 70          CURRENT DIABETIC MEDICATIONS INCLUDE:  MTF  1000 Two tabs BID  Amaryl 2 mg BF daily      MEALS:  3-4 meals a day  Dietary compliance with a low carb diet has been moderate    EXERCISE:  No regular exercise, but is very active         CURRENT MEDICATION:    Current Outpatient Medications   Medication Sig Dispense Refill    glimepiride 1 MG Oral Tab Take 1 tablet (1 mg total) by mouth daily with breakfast.      metFORMIN 500 MG Oral Tab Take 2 tablets (1,000 mg total) by mouth 2 (two) times daily with meals. 400 tablet 2    atorvastatin 10 MG Oral Tab Take 1 tablet (10 mg total) by mouth every evening. 100 tablet 2    polyethylene glycol, PEG 3350-KCl-NaBcb-NaCl-NaSulf, 236 g Oral Recon Soln Take 4,000 mL by mouth As Directed. Take 2,000 mL the night before your procedure and 2,000 mL the morning of your procedure. 1 each 0    Turmeric 400 MG Oral Cap Take by mouth.      PATIENT SUPPLIED MEDICATION Total Beets      fenofibrate micronized 134 MG Oral Cap Take 1 capsule (134 mg total) by mouth daily. 100 capsule 2    vitamin E 600 UNITS Oral Cap Take 600 Units by mouth in the morning.      Multiple Vitamins-Minerals (MULTI-VITAMIN/MINERALS) Oral Tab Take 1 tablet by mouth in the morning.      [employer] LANCETS ULTRATHIN 30G Does not apply Misc Test by Intradermal route  2 times every day 100 each 11    aspirin 81 MG Oral Chew Tab Chew 81 mg by mouth in the morning.      Glucosamine-Chondroitin 750-600 MG Oral Tab Take 1 tablet by mouth in the morning.           ALLERGY:  Allergies   Allergen Reactions    Penicillins      Other reaction(s): Hives       PAST MEDICAL, SOCIAL AND FAMILY HISTORY:  See past medical history marked as reviewed.  See past surgical history marked as reviewed.  See past family history marked as reviewed.  See past social history marked as reviewed.    ASSESSMENTS:     REVIEW OF SYSTEMS:  Constitutional: Negative for: fever, fatigue, cold/heat intolerance, + weight loss intentional   Eyes: Negative for:  Visual changes, proptosis, blurring  ENT: Negative for:  dysphagia, neck swelling, dysphonia  Respiratory: Negative for:  dyspnea, cough  Cardiovascular: Negative for:  chest pain, palpitations, orthopnea  GI: Negative for:  abdominal pain, nausea, vomiting, diarrhea, constipation, bleeding  Neurology: Negative for: headache, numbness, weakness  Genito-Urinary: Negative for: dysuria, frequency  Psychiatric: Negative for:  depression, anxiety  Hematology/Lymphatics: Negative for: bruising, lower extremity edema  Endocrine: Negative for: polyuria, polydypsia  Skin: Negative for: rash, blister, cellulitis,       PHYSICAL EXAM:   Vitals:    05/12/25 0935 05/12/25 1010   BP: 146/78 140/70   Pulse: 68    Weight: 207 lb (93.9 kg)    Height: 5' 10\" (1.778 m)        BMI: Body mass index is 29.7 kg/m².         General Appearance:  alert, well developed, in no acute distress  Head: Atraumatic  Eyes:  normal conjunctivae, sclera., normal sclera and normal pupils  Throat/Neck: normal sound to voice. Normal hearing, normal speech  Respiratory:  Speaking in full sentences, non-labored. no increased work of breathing, no audible wheezing    Neuro: motor grossly intact, moving all extremities without difficulty  Psychiatric:  oriented to time, self, and place  Extremities:   12/2024  Bilateral barefoot skin diabetic exam is normal, visualized feet and the appearance is normal.  Bilateral monofilament/sensation of both feet is normal.  Pulsation pedal pulse exam of both lower legs/feet is normal as well.          DATA:         Pertinent data reviewed  a1c 6.3 % ( 4/2025 )     ASSESSMENT AND PLAN:     1. Type 2 DM:     Plan:  Discussed the pathogenesis, natural course of diabetes. Patient understands the importance of glycemic control and the implications of uncontrolled diabetes including Diabetic ketoacidosis and various micro vascular and macrovascular complications.           a). Medications:        Metformin 1000 mg BID  Take with food  GI SE        Amaryl  2 --> 1 mg BF only  ( he has enough medication at home and will like to use that)   Counselled regarding risk of hypoglycemia associated with use.      I reviewed GLP agonists and SGLT 2 inhibitors, however these are tier 3 and he states he has enquired about the cost and they are extensive    To call if he has any low Bg under 80     b). No h/o Nephropathy :  Ur MA was slightly high in the past , normal on last check. Continued good BG and BP control  c). Discussed importance of annual eye exams  d). Foot exam: Daily feet exam explained  e). BG log maintainence explained in great detail, to get log and glucometer on next visit.  f). Life style changes reviewed  g). Hypoglycemia recognition and management discussed     2. Patient’s BP is slightly high today repreat better   Low salt diet, monitor BP at home  Reports he had cardiac JONES which was okay  FU with PCP   Understands implications of high BP including CAD and CVA  Will continue to address  3. Dyslipidemia  A) Discussed lifestyle modifications including reductions in dietary total and saturated fat, weight loss, aerobic exercise, and eating a diet rich in fruits and vegetables.  B) Statin therapy  Discussed the potential side effects of statins including muscle and liver  injury.  TG high: low fat diet, c/w fenofibrate no SE  Fasting lipid panel reviewed  4. Subclinical hypothyroidism   High TSH , normal Free T4  Clinically no weight gain, constipation, cold intolerance, fatigue  TSh high in April, but normal on repeat testing  Will monitor, recheck in 3 months   To call if he develops symptoms as above     RTC in 5 months  Bib with BG as discussed       Orders Placed This Encounter   Procedures    TSH W Reflex To Free T4    POC HemoCue Glucose 201 (Finger stick glucose)           Kiersten Cortez MD

## 2025-05-20 PROBLEM — K64.9 HEMORRHOIDS: Status: ACTIVE | Noted: 2025-05-20

## 2025-05-20 PROBLEM — K57.30 DIVERTICULOSIS OF COLON: Status: ACTIVE | Noted: 2025-05-20

## 2025-05-20 PROBLEM — K63.5 POLYP OF COLON: Status: ACTIVE | Noted: 2025-05-20

## 2025-05-23 ENCOUNTER — TELEPHONE (OUTPATIENT)
Facility: CLINIC | Age: 70
End: 2025-05-23

## 2025-05-23 NOTE — TELEPHONE ENCOUNTER
----- Message from Brandon De Santiago sent at 5/23/2025  2:09 PM CDT -----  GI staff: please place recall in for colonoscopy in 1 year     ----- Message -----  From: Matthieu Castrejon In  Sent: 5/22/2025   6:45 PM CDT  To: Brandon De Santiago MD

## 2025-05-23 NOTE — TELEPHONE ENCOUNTER
Recall colonoscopy in 1 year per Dr. De Santiago    Colon done 5/20/2025    Health maintenance updated and message sent to patient outreach to repeat colonoscopy in 1 year

## 2025-08-19 ENCOUNTER — TELEPHONE (OUTPATIENT)
Dept: INTERNAL MEDICINE CLINIC | Facility: CLINIC | Age: 70
End: 2025-08-19

## 2025-08-19 RX ORDER — GLIMEPIRIDE 1 MG/1
1 TABLET ORAL
Qty: 90 TABLET | Refills: 0 | Status: CANCELLED | OUTPATIENT
Start: 2025-08-19

## 2025-08-21 RX ORDER — GLIMEPIRIDE 2 MG/1
2 TABLET ORAL
Qty: 90 TABLET | Refills: 3 | OUTPATIENT
Start: 2025-08-21

## 2025-08-22 RX ORDER — GLIMEPIRIDE 1 MG/1
1 TABLET ORAL
Qty: 90 TABLET | Refills: 1 | Status: SHIPPED | OUTPATIENT
Start: 2025-08-22

## (undated) DIAGNOSIS — E78.5 DYSLIPIDEMIA: Primary | ICD-10-CM

## (undated) DIAGNOSIS — M54.50 LOW BACK PAIN, UNSPECIFIED BACK PAIN LATERALITY, UNSPECIFIED CHRONICITY, UNSPECIFIED WHETHER SCIATICA PRESENT: Primary | ICD-10-CM

## (undated) NOTE — LETTER
April 6, 2021     Ronnie Door [place] [id number]      Dear Arelis Thomas:      Below are the results from your recent visit:    Resulted Orders   ASSAY QUANTITATIVE, GLUCOSE   Result Value Ref Range    GLUCOSE BLOOD 200     Test Str

## (undated) NOTE — LETTER
06/03/19        Najma Will  [address] [place] [address]      Dear Odilia Merlin records indicate that you have outstanding lab work and or testing that was ordered for you and has not yet been completed:  Orders Placed This Encounter

## (undated) NOTE — Clinical Note
Patient Name: Agnes Jacobs  : [id number]  MRN: [id number]  Patient Address: [address] [place] [address]      Coronavirus Disease 2019 (COVID-19)     [facility] is committed to the safety and well-being of our patients, members carefully. If your symptoms get worse, call your healthcare provider immediately. 3. Get rest and stay hydrated.    4. If you have a medical appointment, call the healthcare provider ahead of time and tell them that you have or may have COVID-19.  5. For m of fever-reducing medications; and  · Improvement in respiratory symptoms (e.g., cough, shortness of breath); and  · At least 10 days have passed since symptoms first appeared OR if asymptomatic patient or date of symptom onset is unclear then use 10 days donors must:    · Have had a confirmed diagnosis of COVID-19  · Be symptom-free for at least 14 days*    *Some people will be required to have a repeat COVID-19 test in order to be eligible to donate.  If you’re instructed by Moshe that a repeat test is r random. Researchers are trying to identify similarities between people with a Post-COVID condition to better understand if there are risk factors. How do I prevent a Post-COVID condition?   The best way to prevent the long-term symptoms of COVID-19 is

## (undated) NOTE — Clinical Note
Thank you for the consult. I saw  Mr. Will in the endocrine/diabetes clinic today. Please see attached my note. Please feel free to contact me with any questions. Thanks!